# Patient Record
Sex: FEMALE | Race: BLACK OR AFRICAN AMERICAN | NOT HISPANIC OR LATINO | URBAN - METROPOLITAN AREA
[De-identification: names, ages, dates, MRNs, and addresses within clinical notes are randomized per-mention and may not be internally consistent; named-entity substitution may affect disease eponyms.]

---

## 2019-11-12 ENCOUNTER — HOSPITAL ENCOUNTER (EMERGENCY)
Facility: HOSPITAL | Age: 39
Discharge: HOME | End: 2019-11-12
Attending: EMERGENCY MEDICINE

## 2019-11-12 VITALS
BODY MASS INDEX: 35.82 KG/M2 | TEMPERATURE: 99 F | HEIGHT: 65 IN | OXYGEN SATURATION: 96 % | WEIGHT: 215 LBS | HEART RATE: 92 BPM | SYSTOLIC BLOOD PRESSURE: 124 MMHG | RESPIRATION RATE: 20 BRPM | DIASTOLIC BLOOD PRESSURE: 70 MMHG

## 2019-11-12 DIAGNOSIS — J45.901 EXACERBATION OF ASTHMA, UNSPECIFIED ASTHMA SEVERITY, UNSPECIFIED WHETHER PERSISTENT: Primary | ICD-10-CM

## 2019-11-12 DIAGNOSIS — R06.02 SHORTNESS OF BREATH: ICD-10-CM

## 2019-11-12 PROCEDURE — 94761 N-INVAS EAR/PLS OXIMETRY MLT: CPT

## 2019-11-12 PROCEDURE — 93010 EKG 12-LEAD: ICD-10-PCS | Mod: ,,, | Performed by: INTERNAL MEDICINE

## 2019-11-12 PROCEDURE — 94640 AIRWAY INHALATION TREATMENT: CPT

## 2019-11-12 PROCEDURE — 63600175 PHARM REV CODE 636 W HCPCS: Performed by: PHYSICIAN ASSISTANT

## 2019-11-12 PROCEDURE — 93010 ELECTROCARDIOGRAM REPORT: CPT | Mod: ,,, | Performed by: INTERNAL MEDICINE

## 2019-11-12 PROCEDURE — 93005 ELECTROCARDIOGRAM TRACING: CPT

## 2019-11-12 PROCEDURE — 99291 CRITICAL CARE FIRST HOUR: CPT | Mod: 25

## 2019-11-12 PROCEDURE — 96372 THER/PROPH/DIAG INJ SC/IM: CPT

## 2019-11-12 PROCEDURE — 25000242 PHARM REV CODE 250 ALT 637 W/ HCPCS: Performed by: PHYSICIAN ASSISTANT

## 2019-11-12 RX ORDER — IPRATROPIUM BROMIDE AND ALBUTEROL SULFATE 2.5; .5 MG/3ML; MG/3ML
3 SOLUTION RESPIRATORY (INHALATION) EVERY 6 HOURS PRN
COMMUNITY

## 2019-11-12 RX ORDER — ALBUTEROL SULFATE 2.5 MG/.5ML
2.5 SOLUTION RESPIRATORY (INHALATION)
Status: COMPLETED | OUTPATIENT
Start: 2019-11-12 | End: 2019-11-12

## 2019-11-12 RX ORDER — PREDNISONE 50 MG/1
50 TABLET ORAL DAILY
Qty: 5 TABLET | Refills: 0 | Status: SHIPPED | OUTPATIENT
Start: 2019-11-12 | End: 2019-11-17

## 2019-11-12 RX ORDER — ALBUTEROL SULFATE 90 UG/1
2 AEROSOL, METERED RESPIRATORY (INHALATION)
COMMUNITY
Start: 2019-01-10

## 2019-11-12 RX ORDER — FLUTICASONE FUROATE AND VILANTEROL 200; 25 UG/1; UG/1
1 POWDER RESPIRATORY (INHALATION)
COMMUNITY
Start: 2019-01-10

## 2019-11-12 RX ORDER — METHYLPREDNISOLONE SOD SUCC 125 MG
125 VIAL (EA) INJECTION
Status: COMPLETED | OUTPATIENT
Start: 2019-11-12 | End: 2019-11-12

## 2019-11-12 RX ORDER — IPRATROPIUM BROMIDE 0.5 MG/2.5ML
0.5 SOLUTION RESPIRATORY (INHALATION)
Status: COMPLETED | OUTPATIENT
Start: 2019-11-12 | End: 2019-11-12

## 2019-11-12 RX ORDER — PREDNISONE 20 MG/1
60 TABLET ORAL
Status: COMPLETED | OUTPATIENT
Start: 2019-11-12 | End: 2019-11-12

## 2019-11-12 RX ORDER — CETIRIZINE HYDROCHLORIDE 10 MG/1
TABLET ORAL
COMMUNITY
Start: 2017-09-22

## 2019-11-12 RX ADMIN — ALBUTEROL SULFATE 2.5 MG: 2.5 SOLUTION RESPIRATORY (INHALATION) at 07:11

## 2019-11-12 RX ADMIN — METHYLPREDNISOLONE SODIUM SUCCINATE 125 MG: 125 INJECTION, POWDER, FOR SOLUTION INTRAMUSCULAR; INTRAVENOUS at 07:11

## 2019-11-12 RX ADMIN — PREDNISONE 60 MG: 20 TABLET ORAL at 09:11

## 2019-11-12 RX ADMIN — ALBUTEROL SULFATE 2.5 MG: 2.5 SOLUTION RESPIRATORY (INHALATION) at 06:11

## 2019-11-12 RX ADMIN — IPRATROPIUM BROMIDE 0.5 MG: 0.5 SOLUTION RESPIRATORY (INHALATION) at 07:11

## 2019-11-13 NOTE — ED PROVIDER NOTES
Encounter Date: 11/12/2019       History     Chief Complaint   Patient presents with    Shortness of Breath     Pt with history of asthma, co sob since Saturday     This is a 38-year-old female with past medical history of asthma presents to the emergency department complaining of shortness of breath and wheezing that started on Friday.  She reports mild improvement with using her breathing machine and inhaler.  Her shortness of breath worsens with lying and improves with sitting up.  She reports an occasional cough with mucoid sputum.  She denies fever, nasal congestion, headache.  She reports a previous exacerbation that is similar to this severity when she was diagnosed with pneumonia in the past.        Review of patient's allergies indicates:  No Known Allergies  Past Medical History:   Diagnosis Date    Asthma      History reviewed. No pertinent surgical history.  History reviewed. No pertinent family history.  Social History     Tobacco Use    Smoking status: Never Smoker    Smokeless tobacco: Never Used   Substance Use Topics    Alcohol use: Yes     Comment: socially    Drug use: Never     Review of Systems   Constitutional: Negative for chills, fatigue and fever.   HENT: Negative for ear pain, sinus pain and sore throat.    Eyes: Negative for pain.   Respiratory: Positive for chest tightness, shortness of breath and wheezing.    Cardiovascular: Negative for chest pain.   Gastrointestinal: Negative for abdominal pain.   Musculoskeletal: Negative for arthralgias and back pain.   Skin: Negative for pallor and rash.   Neurological: Negative for dizziness.   All other systems reviewed and are negative.      Physical Exam     Initial Vitals [11/12/19 1746]   BP Pulse Resp Temp SpO2   119/79 94 20 98.5 °F (36.9 °C) 100 %      MAP       --         Physical Exam    Nursing note and vitals reviewed.  Constitutional: She appears well-developed and well-nourished. She is not diaphoretic. No distress.   HENT:    Head: Normocephalic and atraumatic.   Eyes: EOM are normal. Pupils are equal, round, and reactive to light.   Neck: Normal range of motion. Neck supple.   Cardiovascular: Normal rate and regular rhythm.   Pulmonary/Chest: No respiratory distress. She exhibits no tenderness.   Abdominal: Soft. Bowel sounds are normal. She exhibits no distension. There is no tenderness. There is no rebound and no guarding.   There is mild wheezes heard on bilateral ausculation.  There is poor air movement noted.   Musculoskeletal: Normal range of motion. She exhibits no edema.   Neurological: She is alert and oriented to person, place, and time.   Skin: Skin is warm. Capillary refill takes less than 2 seconds. No erythema.         ED Course   Critical Care  Date/Time: 11/12/2019 7:00 PM  Performed by: Faith Moctezuma PA-C  Authorized by: Aleena Day MD   Direct patient critical care time: 20 minutes  Additional history critical care time: 20 minutes  Ordering / reviewing critical care time: 7 minutes  Documentation critical care time: 14 minutes  Total critical care time (exclusive of procedural time) : 61 minutes  Critical care was necessary to treat or prevent imminent or life-threatening deterioration of the following conditions: respiratory failure.  Critical care was time spent personally by me on the following activities: development of treatment plan with patient or surrogate, evaluation of patient's response to treatment, examination of patient, obtaining history from patient or surrogate, ordering and performing treatments and interventions, ordering and review of radiographic studies, pulse oximetry, re-evaluation of patient's condition and review of old charts.  Subsequent provider of critical care: I assumed direction of critical care for this patient from another provider of my specialty.        Labs Reviewed - No data to display  EKG Readings: (Independently Interpreted)   Initial Reading: No STEMI. Rhythm:  Sinus Tachycardia. Heart Rate: 108. Ectopy: No Ectopy. Conduction: Normal.       Imaging Results          X-Ray Chest PA And Lateral (Final result)  Result time 11/12/19 20:38:52    Final result by Cheng Sethi MD (11/12/19 20:38:52)                 Impression:      Normal.      Electronically signed by: Cheng Sethi  Date:    11/12/2019  Time:    20:38             Narrative:    EXAMINATION:  XR CHEST PA AND LATERAL    CLINICAL HISTORY:  Asthma;    TECHNIQUE:  PA and lateral views of the chest were performed.    COMPARISON:  None    FINDINGS:  Frontal and lateral views presented.  Heart and hilar shadows mediastinal contours trachea and lungs and pleura appear normal.  No fracture seen.  Visualized spine appears intact.  No abdominal free air.                              X-Rays:   Independently Interpreted Readings:   Other Readings:  Chest x-ray is without any acute cardiopulmonary processes.          APC / Resident Notes:   This is an urgent evaluation of a 38-year-old female who presents to the emergency department complaining of an asthma exacerbation.    Previous medical records were obtained and reviewed.  This patient has a history of asthma.    The patient is currently afebrile and nontoxic in appearance.  Her vital signs were stable. Her pulse ox on room air was noted to be 100%.  On physical exam, there is mild wheezes noted on auscultation bilaterally.  However, the patient is not moving air well.  It is noted that she does become short of breath when she lays down.  The remaining physical exam is unremarkable.  There was no evidence of pneumonia, strep pharyngitis, meningitis.    A chest x-ray was performed.  This revealed no acute cardiopulmonary processes.  She was given 1 breathing treatment.  On re-evaluation, air movement was improved yet still noted to be poor.  Mild wheezes could be heard.  Another breathing treatment was given.  Again, the patient's vital signs remained stable and the  patient reported some relief in symptoms.  However, the patient continued to complain of shortness of breath when lying down.  An hour long continuous duo nebulizer was administered at this time.  On re-evaluation, the patient was moving air well, talking in long sentences, and reported feeling better.  I believe this patient is having an acute asthma exacerbation.  While in the emergency department and during her breathing treatments, IM steroids were administered.  Prior to discharge she was given oral steroids to continue at home.  I will encourage her to continue taking nebulizer treatments and follow up with her doctor.  The patient reports she is noncompliant with her singular that is prescribed by her PCP.  I spent extended period of time talking to her about the importance of medication compliance to control her asthma.  She must follow up with her PCP in the next 48 hr.    Prior to discharge, the patient reports that she still feels as though she is not completely better.  Again, is explained to her the treatment plan.  The patient does not want to be admitted and there are no indications or need for admission at this time.  Signs and symptoms of worsening were thoroughly reviewed with the patient she verbalized understanding and agreement.  She was safe and stable for discharge.                              Clinical Impression:       ICD-10-CM ICD-9-CM   1. Exacerbation of asthma, unspecified asthma severity, unspecified whether persistent J45.901 493.92   2. Shortness of breath R06.02 786.05         Disposition:   Disposition: Discharged  Condition: Stable                     Faith Moctezuma PA-C  11/13/19 1703

## 2019-11-13 NOTE — DISCHARGE INSTRUCTIONS
Continue taking the duonebs every 4-6 hours.  You must follow up with your PCP within the next 24 hours.  Take the steroids as prescribed until complete.  You should start taking the Singular your PCP prescribed previously.

## 2019-11-13 NOTE — ED TRIAGE NOTES
The pt states on Friday she has sinus issues and runny nose. Reports the cough stared on Saturday, so she begin taking her breathing treatments. Says she has a history of asthma. Is out of steroids.

## 2020-04-21 DIAGNOSIS — Z01.84 ANTIBODY RESPONSE EXAMINATION: ICD-10-CM

## 2020-05-21 DIAGNOSIS — Z01.84 ANTIBODY RESPONSE EXAMINATION: ICD-10-CM

## 2020-06-20 DIAGNOSIS — Z01.84 ANTIBODY RESPONSE EXAMINATION: ICD-10-CM

## 2020-07-20 DIAGNOSIS — Z01.84 ANTIBODY RESPONSE EXAMINATION: ICD-10-CM

## 2020-08-19 DIAGNOSIS — Z01.84 ANTIBODY RESPONSE EXAMINATION: ICD-10-CM

## 2020-09-18 DIAGNOSIS — Z01.84 ANTIBODY RESPONSE EXAMINATION: ICD-10-CM

## 2020-10-18 DIAGNOSIS — Z01.84 ANTIBODY RESPONSE EXAMINATION: ICD-10-CM

## 2020-11-17 DIAGNOSIS — Z01.84 ANTIBODY RESPONSE EXAMINATION: ICD-10-CM

## 2023-02-21 ENCOUNTER — HOSPITAL ENCOUNTER (EMERGENCY)
Facility: HOSPITAL | Age: 43
Discharge: HOME | End: 2023-02-21
Attending: EMERGENCY MEDICINE
Payer: MEDICAID

## 2023-02-21 VITALS
WEIGHT: 215 LBS | HEART RATE: 79 BPM | HEIGHT: 65 IN | BODY MASS INDEX: 35.82 KG/M2 | TEMPERATURE: 98 F | RESPIRATION RATE: 18 BRPM | DIASTOLIC BLOOD PRESSURE: 80 MMHG | OXYGEN SATURATION: 100 % | SYSTOLIC BLOOD PRESSURE: 127 MMHG

## 2023-02-21 DIAGNOSIS — H66.90 ACUTE OTITIS MEDIA, UNSPECIFIED OTITIS MEDIA TYPE: Primary | ICD-10-CM

## 2023-02-21 DIAGNOSIS — J06.9 VIRAL URI WITH COUGH: ICD-10-CM

## 2023-02-21 LAB
B-HCG UR QL: NEGATIVE
CTP QC/QA: YES
MOLECULAR STREP A: NEGATIVE
POC MOLECULAR INFLUENZA A AGN: NEGATIVE
POC MOLECULAR INFLUENZA B AGN: NEGATIVE
SARS-COV-2 RDRP RESP QL NAA+PROBE: NEGATIVE

## 2023-02-21 PROCEDURE — 87502 INFLUENZA DNA AMP PROBE: CPT

## 2023-02-21 PROCEDURE — 87651 STREP A DNA AMP PROBE: CPT

## 2023-02-21 PROCEDURE — 99284 EMERGENCY DEPT VISIT MOD MDM: CPT

## 2023-02-21 PROCEDURE — 81025 URINE PREGNANCY TEST: CPT

## 2023-02-21 RX ORDER — BENZONATATE 100 MG/1
100 CAPSULE ORAL 3 TIMES DAILY PRN
Qty: 30 CAPSULE | Refills: 0 | Status: SHIPPED | OUTPATIENT
Start: 2023-02-21

## 2023-02-21 RX ORDER — PROMETHAZINE HYDROCHLORIDE AND DEXTROMETHORPHAN HYDROBROMIDE 6.25; 15 MG/5ML; MG/5ML
5 SYRUP ORAL EVERY 4 HOURS PRN
Qty: 118 ML | Refills: 0 | Status: SHIPPED | OUTPATIENT
Start: 2023-02-21

## 2023-02-21 RX ORDER — FLUTICASONE PROPIONATE 50 MCG
1 SPRAY, SUSPENSION (ML) NASAL 2 TIMES DAILY PRN
Qty: 15 G | Refills: 0 | Status: SHIPPED | OUTPATIENT
Start: 2023-02-21

## 2023-02-21 RX ORDER — ACETAMINOPHEN 500 MG
500 TABLET ORAL EVERY 6 HOURS PRN
Qty: 20 TABLET | Refills: 0 | Status: SHIPPED | OUTPATIENT
Start: 2023-02-21

## 2023-02-21 RX ORDER — IBUPROFEN 600 MG/1
600 TABLET ORAL EVERY 6 HOURS PRN
Qty: 20 TABLET | Refills: 0 | Status: SHIPPED | OUTPATIENT
Start: 2023-02-21

## 2023-02-21 RX ORDER — AMOXICILLIN AND CLAVULANATE POTASSIUM 875; 125 MG/1; MG/1
1 TABLET, FILM COATED ORAL 2 TIMES DAILY
Qty: 14 TABLET | Refills: 0 | Status: SHIPPED | OUTPATIENT
Start: 2023-02-21 | End: 2023-02-28

## 2023-02-21 NOTE — FIRST PROVIDER EVALUATION
Emergency Department TeleTriage Encounter Note      CHIEF COMPLAINT    Chief Complaint   Patient presents with    Sore Throat     Pt reporting body aches, sore throat, cough x 1 week.       VITAL SIGNS   Initial Vitals [02/21/23 1459]   BP Pulse Resp Temp SpO2   122/85 98 18 98.8 °F (37.1 °C) 98 %      MAP       --            ALLERGIES    Review of patient's allergies indicates:  No Known Allergies    PROVIDER TRIAGE NOTE  This is a teletriage evaluation of a 42 y.o. female presenting to the ED complaining of sore throat. Patient reports body ache, sore throat, cough, and low grade fever. Son with similar symptoms. Symptom onset 7 days ago.    Patient is alert and oriented. She speaks in complete sentences. She is sitting upright in the chair in no distress.     Initial orders will be placed and care will be transferred to an alternate provider when patient is roomed for a full evaluation. Any additional orders and the final disposition will be determined by that provider.         ORDERS  Labs Reviewed   SARS-COV-2 RDRP GENE   POCT INFLUENZA A/B MOLECULAR   POCT URINE PREGNANCY   POCT STREP A MOLECULAR       ED Orders (720h ago, onward)      Start Ordered     Status Ordering Provider    02/21/23 1504 02/21/23 1503  POCT urine pregnancy  Once         Ordered HOLDSWORTH, ALAYNA    02/21/23 1504 02/21/23 1503  POCT Strep A, Molecular  Once         Ordered HOLDSWORTH, ALAYNA    02/21/23 1502 02/21/23 1501  POCT COVID-19 Rapid Screening  Once         Ordered KULWANT WATSON    02/21/23 1502 02/21/23 1501  POCT Influenza A/B Molecular  Once         Ordered KULWANT WATSON              Virtual Visit Note: The provider triage portion of this emergency department evaluation and documentation was performed via [employer], a HIPAA-compliant telemedicine application, in concert with a tele-presenter in the room. A face to face patient evaluation with one of my colleagues will occur once the patient is placed in an  emergency department room.      DISCLAIMER: This note was prepared with [employer] voice recognition transcription software. Garbled syntax, mangled pronouns, and other bizarre constructions may be attributed to that software system.

## 2023-02-21 NOTE — ED PROVIDER NOTES
"Encounter Date: 2/21/2023    SCRIBE #1 NOTE: IMichelle, am scribing for, and in the presence of,  Alayna Holdsworth, PA-C. I have scribed the following portions of the note - Other sections scribed: NEFTALI TODD   SCRIBE #2 NOTE: IZULEYKA, am scribing for, and in the presence of,  Alayna Holdsworth, PA-C. I have scribed the following portions of the note - Other sections scribed: CAROLYN TODD.   History     Chief Complaint   Patient presents with    Sore Throat     Pt reporting body aches, sore throat, cough x 1 week.     42-year-old female, with a PMHx of asthma, presents to the ED with complaints of generalized myalgias and congestion onset one week ago. Patient reports symptoms of generalized myalgias, congestion, "stuffy" ears, rhinorrhea, sore throat, wet cough, and intermittent headaches. Endorses taking Zyrtec, Tylenol, Vitamin-C, and Emergen-C with minimal relief of symptoms. Endorses recent sick contact with her son. No other exacerbating or alleviating factors. Denies any fever, sweats, chills, SOB, CP, abdominal pain, nausea, emesis, diarrhea, constipation, dysuria, urinary frequency, or other associated symptoms.     The history is provided by the patient. No  was used.   Review of patient's allergies indicates:  No Known Allergies  Past Medical History:   Diagnosis Date    Asthma      No past surgical history on file.  No family history on file.  Social History     Tobacco Use    Smoking status: Never    Smokeless tobacco: Never   Substance Use Topics    Alcohol use: Yes     Comment: socially    Drug use: Never     Review of Systems   Constitutional:  Negative for chills, diaphoresis, fatigue and fever.   HENT:  Positive for congestion, ear pain (bilateral), rhinorrhea, sinus pressure and sore throat. Negative for ear discharge, postnasal drip, sneezing and voice change.         (+) Stuffy ears.   Respiratory:  Positive for cough (wet). Negative for shortness of breath.  "   Cardiovascular:  Negative for chest pain.   Gastrointestinal:  Negative for abdominal pain, constipation, diarrhea, nausea and vomiting.   Genitourinary:  Negative for decreased urine volume, difficulty urinating, dysuria, frequency and urgency.   Musculoskeletal:  Positive for myalgias (body aches).   Skin:  Negative for rash and wound.   Neurological:  Positive for headaches. Negative for dizziness, weakness, light-headedness and numbness.     Physical Exam     Initial Vitals [02/21/23 1459]   BP Pulse Resp Temp SpO2   122/85 98 18 98.8 °F (37.1 °C) 98 %      MAP       --         Physical Exam    Nursing note and vitals reviewed.  Constitutional: Vital signs are normal. She appears well-developed and well-nourished. She is not diaphoretic. She is active. She does not appear ill. No distress.   HENT:   Head: Normocephalic and atraumatic.   Right Ear: External ear normal. There is tenderness. Tympanic membrane is erythematous.   Left Ear: Tympanic membrane, external ear and ear canal normal.   Nose: Nose normal.   Mouth/Throat: Uvula is midline, oropharynx is clear and moist and mucous membranes are normal.   Eyes: Conjunctivae, EOM and lids are normal. Pupils are equal, round, and reactive to light. Right eye exhibits no discharge. Left eye exhibits no discharge.   Neck: Phonation normal. Neck supple.   Normal range of motion.   Full passive range of motion without pain.     Cardiovascular:  Normal rate and regular rhythm.           Pulmonary/Chest: Effort normal and breath sounds normal. No respiratory distress.   Abdominal: She exhibits no distension.   Musculoskeletal:         General: Normal range of motion.      Cervical back: Full passive range of motion without pain, normal range of motion and neck supple.     Neurological: She is alert and oriented to person, place, and time. GCS eye subscore is 4. GCS verbal subscore is 5. GCS motor subscore is 6.   Skin: Skin is dry. Capillary refill takes less than 2  seconds.       ED Course   Procedures  Labs Reviewed   SARS-COV-2 RDRP GENE   POCT INFLUENZA A/B MOLECULAR   POCT URINE PREGNANCY   POCT STREP A MOLECULAR          Imaging Results    None          Medications - No data to display  Medical Decision Making:   History:   Old Medical Records: I decided to obtain old medical records.  Initial Assessment:   42-year-old female, with a PMHx of asthma, presents to the ED with complaints of generalized myalgias and congestion.  Patient's chart and medical history reviewed.  Differential Diagnosis:   COVID  Influenza  Strep pharyngitis  Viral pharyngitis  Pankaj's angina  Retropharyngeal abscess  Tonsillar abscess  AOM  Otitis externa  Clinical Tests:   Lab Tests: Ordered and Reviewed  ED Management:  Patient's vitals reviewed.  She is afebrile, no respiratory distress, nontoxic-appearing in the ED. Patient had a right erythematous TM with tenderness.  Patient denied pain medication.  UPT is negative.  Patient's COVID, flu, and strep negative.  Patient will be sent home with Augmentin for her acute otitis media.  Instructed patient to rest and stay well hydrated, she verbalized understanding.  Patient will also be sent home on Motrin, Tylenol, breath seen day cough syrup, Tessalon Perles, and Flonase for symptomatic control. Patient agrees with this plan. Discussed with her strict return precautions, she verbalized understanding. Patient is stable for discharge.         Scribe Attestation:   Scribe #1: I performed the above scribed service and the documentation accurately describes the services I performed. I attest to the accuracy of the note.  Scribe #2: I performed the above scribed service and the documentation accurately describes the services I performed. I attest to the accuracy of the note.                 Scribe attestation: I, Alayna Holdsworth,PA-C, personally performed the services described in this documentation. All medical record entries made by the scribe were  at my direction and in my presence.  I have reviewed the chart and agree that the record reflects my personal performance and is accurate and complete.   Clinical Impression:   Final diagnoses:  [J06.9] Viral URI with cough  [H66.90] Acute otitis media, unspecified otitis media type (Primary)        ED Disposition Condition    Discharge Stable          ED Prescriptions       Medication Sig Dispense Start Date End Date Auth. Provider    amoxicillin-clavulanate 875-125mg (AUGMENTIN) 875-125 mg per tablet Take 1 tablet by mouth 2 (two) times daily. for 7 days 14 tablet 2/21/2023 2/28/2023 Alayna Holdsworth, PA-C    ibuprofen (ADVIL,MOTRIN) 600 MG tablet Take 1 tablet (600 mg total) by mouth every 6 (six) hours as needed for Pain. 20 tablet 2/21/2023 -- Alayna Holdsworth, PA-C    acetaminophen (TYLENOL) 500 MG tablet Take 1 tablet (500 mg total) by mouth every 6 (six) hours as needed for Temperature greater than or Pain. 20 tablet 2/21/2023 -- Alayna Holdsworth, PA-C    promethazine-dextromethorphan (PROMETHAZINE-DM) 6.25-15 mg/5 mL Syrp Take 5 mLs by mouth every 4 (four) hours as needed (cough/congestion). 118 mL 2/21/2023 -- Alayna Holdsworth, PA-C    benzonatate (TESSALON) 100 MG capsule Take 1 capsule (100 mg total) by mouth 3 (three) times daily as needed for Cough. 30 capsule 2/21/2023 -- Alayna Holdsworth, PA-C    fluticasone propionate (FLONASE) 50 mcg/actuation nasal spray 1 spray (50 mcg total) by Each Nostril route 2 (two) times daily as needed for Rhinitis or Allergies. 15 g 2/21/2023 -- Alayna Holdsworth, PA-C          Follow-up Information       Follow up With Specialties Details Why Contact [facility] - Emergency Dept Emergency Medicine  If symptoms worsen [id number] [place] [address] [phone]             Alayna Holdsworth, PA-C  02/21/23 1926

## 2023-02-21 NOTE — DISCHARGE INSTRUCTIONS
Thank you for coming to our Emergency Department today. It is important to remember that some problems are difficult to diagnose and may not be found during your first visit. Be sure to follow up with your primary care doctor and review any labs/imaging that was performed with them. If you do not have a primary care doctor, you may contact the one listed on your discharge paperwork or you may also call the [facility] Appointment Desk at [phone] to schedule an appointment with one.     All medications may potentially have side effects and it is impossible to predict which medications may give you side effects. If you feel that you are having a negative effect of any medication you should immediately stop taking them and seek medical attention.    Return to the ER with any questions/concerns, new/concerning symptoms, worsening or failure to improve. Do not drive or make any important decisions for 24 hours if you have received any pain medications, sedatives or mood altering drugs during your ER visit.

## 2023-11-14 ENCOUNTER — HOSPITAL ENCOUNTER (EMERGENCY)
Facility: HOSPITAL | Age: 43
Discharge: HOME | End: 2023-11-14
Attending: EMERGENCY MEDICINE
Payer: COMMERCIAL

## 2023-11-14 VITALS
BODY MASS INDEX: 34.99 KG/M2 | OXYGEN SATURATION: 97 % | RESPIRATION RATE: 20 BRPM | WEIGHT: 210 LBS | DIASTOLIC BLOOD PRESSURE: 79 MMHG | HEART RATE: 108 BPM | HEIGHT: 65 IN | SYSTOLIC BLOOD PRESSURE: 109 MMHG | TEMPERATURE: 101 F

## 2023-11-14 DIAGNOSIS — U07.1 COVID-19: Primary | ICD-10-CM

## 2023-11-14 DIAGNOSIS — R50.9 FEVER: ICD-10-CM

## 2023-11-14 DIAGNOSIS — R05.9 COUGH: ICD-10-CM

## 2023-11-14 LAB
B-HCG UR QL: NEGATIVE
CTP QC/QA: YES
MOLECULAR STREP A: NEGATIVE
POC MOLECULAR INFLUENZA A AGN: NEGATIVE
POC MOLECULAR INFLUENZA B AGN: NEGATIVE
SARS-COV-2 RDRP RESP QL NAA+PROBE: POSITIVE

## 2023-11-14 PROCEDURE — 81025 URINE PREGNANCY TEST: CPT

## 2023-11-14 PROCEDURE — 87502 INFLUENZA DNA AMP PROBE: CPT

## 2023-11-14 PROCEDURE — 87651 STREP A DNA AMP PROBE: CPT

## 2023-11-14 PROCEDURE — 25000003 PHARM REV CODE 250

## 2023-11-14 PROCEDURE — 25000003 PHARM REV CODE 250: Performed by: NURSE PRACTITIONER

## 2023-11-14 PROCEDURE — 96372 THER/PROPH/DIAG INJ SC/IM: CPT | Performed by: NURSE PRACTITIONER

## 2023-11-14 PROCEDURE — 87635 SARS-COV-2 COVID-19 AMP PRB: CPT

## 2023-11-14 PROCEDURE — 99284 EMERGENCY DEPT VISIT MOD MDM: CPT | Mod: 25

## 2023-11-14 PROCEDURE — 63600175 PHARM REV CODE 636 W HCPCS: Performed by: NURSE PRACTITIONER

## 2023-11-14 RX ORDER — IBUPROFEN 600 MG/1
600 TABLET ORAL EVERY 6 HOURS PRN
Qty: 20 TABLET | Refills: 0 | Status: SHIPPED | OUTPATIENT
Start: 2023-11-14

## 2023-11-14 RX ORDER — ONDANSETRON 4 MG/1
4 TABLET, ORALLY DISINTEGRATING ORAL
Status: COMPLETED | OUTPATIENT
Start: 2023-11-14 | End: 2023-11-14

## 2023-11-14 RX ORDER — ONDANSETRON 4 MG/1
4 TABLET, ORALLY DISINTEGRATING ORAL EVERY 6 HOURS PRN
Qty: 20 TABLET | Refills: 0 | Status: SHIPPED | OUTPATIENT
Start: 2023-11-14

## 2023-11-14 RX ORDER — KETOROLAC TROMETHAMINE 30 MG/ML
30 INJECTION, SOLUTION INTRAMUSCULAR; INTRAVENOUS
Status: COMPLETED | OUTPATIENT
Start: 2023-11-14 | End: 2023-11-14

## 2023-11-14 RX ORDER — GUAIFENESIN/DEXTROMETHORPHAN 100-10MG/5
5 SYRUP ORAL 4 TIMES DAILY PRN
Qty: 120 ML | Refills: 0 | Status: SHIPPED | OUTPATIENT
Start: 2023-11-14 | End: 2023-11-24

## 2023-11-14 RX ORDER — ACETAMINOPHEN 500 MG
1000 TABLET ORAL
Status: COMPLETED | OUTPATIENT
Start: 2023-11-14 | End: 2023-11-14

## 2023-11-14 RX ADMIN — KETOROLAC TROMETHAMINE 30 MG: 30 INJECTION, SOLUTION INTRAMUSCULAR; INTRAVENOUS at 04:11

## 2023-11-14 RX ADMIN — ACETAMINOPHEN 1000 MG: 500 TABLET ORAL at 02:11

## 2023-11-14 RX ADMIN — ONDANSETRON 4 MG: 4 TABLET, ORALLY DISINTEGRATING ORAL at 03:11

## 2023-11-14 NOTE — ED PROVIDER NOTES
Encounter Date: 11/14/2023       History     Chief Complaint   Patient presents with    Generalized Body Aches     PT presents to ED with c/o HA, bodyaches, sore throat, V/D x 5 days. Denies abdominal pain and fever.      CC: URI    HPI: Kianna Beckett is a 42 y.o. female who presents to the ED for evaluation of generalized body aches onset 5 days ago. Pt complains of headache, nausea, vomiting, diarrhea, cough, rhinorrhea, decreased appetite, sore throat, congestion, and fatigue. Pt denies any aggravating/alleviating factors. Pt denies taking any medications for their symptoms. Pt denies fever, chills, abdominal pain, or any other associated symptoms. Pt denies any known allergies.     The history is provided by the patient. No  was used.     Review of patient's allergies indicates:  No Known Allergies  Past Medical History:   Diagnosis Date    Asthma      History reviewed. No pertinent surgical history.  History reviewed. No pertinent family history.  Social History     Tobacco Use    Smoking status: Never    Smokeless tobacco: Never   Substance Use Topics    Alcohol use: Yes     Comment: socially    Drug use: Never     Review of Systems   Constitutional:  Positive for appetite change. Negative for chills and fever.   HENT:  Positive for congestion, rhinorrhea and sore throat. Negative for ear pain and trouble swallowing.    Eyes:  Negative for pain, discharge and redness.   Respiratory:  Negative for cough and shortness of breath.    Cardiovascular:  Negative for chest pain.   Gastrointestinal:  Positive for diarrhea, nausea and vomiting. Negative for abdominal pain.   Genitourinary:  Negative for decreased urine volume and dysuria.   Musculoskeletal:  Positive for myalgias. Negative for back pain, neck pain and neck stiffness.   Skin:  Negative for rash.   Neurological:  Positive for headaches. Negative for dizziness, weakness, light-headedness and numbness.   Psychiatric/Behavioral:   Negative for confusion.        Physical Exam     Initial Vitals [11/14/23 1422]   BP Pulse Resp Temp SpO2   109/79 108 20 (!) 101.1 °F (38.4 °C) 97 %      MAP       --         Physical Exam    Nursing note and vitals reviewed.  Constitutional: She appears well-developed and well-nourished. She is not diaphoretic. She is active and cooperative.  Non-toxic appearance. She does not have a sickly appearance. She appears ill. No distress.   HENT:   Head: Normocephalic and atraumatic.   Right Ear: External ear normal.   Left Ear: External ear normal.   Nose: Nose normal.   Eyes: Conjunctivae and EOM are normal. Right eye exhibits no discharge. Left eye exhibits no discharge.   Neck: Neck supple. No tracheal deviation present.   Normal range of motion.  Cardiovascular:  Normal rate.           Pulmonary/Chest: No stridor. No respiratory distress.   Abdominal: Abdomen is soft. She exhibits no distension. There is no abdominal tenderness.   Musculoskeletal:         General: No tenderness. Normal range of motion.      Cervical back: Normal range of motion and neck supple.     Neurological: She is alert and oriented to person, place, and time. She has normal strength. No cranial nerve deficit or sensory deficit.   Skin: Skin is warm and dry.   Psychiatric: She has a normal mood and affect. Her behavior is normal. Judgment and thought content normal.         ED Course   Procedures  Labs Reviewed   SARS-COV-2 RDRP GENE - Abnormal; Notable for the following components:       Result Value    POC Rapid COVID Positive (*)     All other components within normal limits   POCT STREP A MOLECULAR   POCT INFLUENZA A/B MOLECULAR   POCT URINE PREGNANCY          Imaging Results              X-Ray Chest PA And Lateral (Final result)  Result time 11/14/23 16:20:34      Final result by Efe Elena MD (11/14/23 16:20:34)                   Impression:      No acute radiographic abnormality.      Electronically signed by: Efe  Ronleanne marieraul  Date:    11/14/2023  Time:    16:20               Narrative:    EXAMINATION:  XR CHEST PA AND LATERAL    CLINICAL HISTORY:  Cough, unspecified    TECHNIQUE:  PA and lateral views of the chest were performed.    COMPARISON:  11/12/2019    FINDINGS:  The lungs are clear, with normal appearance of pulmonary vasculature and no pleural effusion or pneumothorax.    The cardiac silhouette is normal in size. The hilar and mediastinal contours are unremarkable.    Bones are intact.                                       Medications   acetaminophen tablet 1,000 mg (1,000 mg Oral Given 11/14/23 1436)   ondansetron disintegrating tablet 4 mg (4 mg Oral Given 11/14/23 1509)   ketorolac injection 30 mg (30 mg Intramuscular Given 11/14/23 1604)     Medical Decision Making  DDx:  Influenza, viral syndrome, COVID-19, strep pharyngitis, viral pharyngitis, otitis media, sinusitis, pneumonia, bronchitis, meningitis, sepsis, others    HPI and physical exam as above.      The patient appears to have a viral infection.  Positive for COVID in the ER.  Based upon the history and physical exam the patient does not appear to have a serious bacterial infection such as sepsis, otitis media, bacterial sinusitis, strep pharyngitis, parapharyngeal or peritonsillar abscess, meningitis. Chest x-ray shows nothing acute.  Respiratory effort is normal.  Mucous membranes are moist and the patient is tolerating P.O. without difficulty.  Patient is febrile but treated with antipyretics.  Patient is nontoxic, alert, active, and appears very well at this time just prior to discharge.  Room air oxygen saturation 97%.  I have given specific return precautions to the patient regarding dyspnea.  I will prescribe medications to treat the patient's symptoms.     The results and physical exam findings were reviewed with the patient.  I advised the patient to remain home and self-isolate from others. The patient should wear a surgical mask at all times  when near others.  Strict ED return precautions given especially concerning worsening shortness of breath/dyspnea. All questions regarding diagnosis and plan were answered to the patient's fullest possible satisfaction. Patient expressed understanding of diagnosis, discharge instructions, and return precautions.      Amount and/or Complexity of Data Reviewed  External Data Reviewed: notes.  Labs: ordered. Decision-making details documented in ED Course.  Radiology: ordered. Decision-making details documented in ED Course.    Risk  OTC drugs.  Prescription drug management.            Scribe Attestation:   Scribe #1: I performed the above scribed service and the documentation accurately describes the services I performed. I attest to the accuracy of the note.                      Scribe attestation: I, Juventino Smith NP, personally performed the services described in this documentation.  All medical record entries made by the scribe were at my direction and in my presence.  I have reviewed the chart and agree that the record reflects my personal performance and is accurate and complete.    Clinical Impression:   Final diagnoses:  [U07.1] COVID-19 (Primary)  [R05.9] Cough  [R50.9] Fever        ED Disposition Condition    Discharge Stable          ED Prescriptions       Medication Sig Dispense Start Date End Date Auth. Provider    nirmatrelvir-ritonavir 300 mg (150 mg x 2)-100 mg copackaged tablets (EUA) Take 3 tablets by mouth 2 (two) times daily for 5 days. Each dose contains 2 nirmatrelvir (pink tablets) and 1 ritonavir (white tablet). Take all 3 tablets together 30 tablet 11/14/2023 11/19/2023 Juventino Smith, AUGUSTIN    ibuprofen (ADVIL,MOTRIN) 600 MG tablet Take 1 tablet (600 mg total) by mouth every 6 (six) hours as needed for Pain. 20 tablet 11/14/2023 -- Juventino Smith, NP    ondansetron (ZOFRAN-ODT) 4 MG TbDL Take 1 tablet (4 mg total) by mouth every 6 (six) hours as needed (Nausea). 20 tablet 11/14/2023 --  Juventino Smith, NP    dextromethorphan-guaiFENesin  mg/5 ml (ROBITUSSIN-DM)  mg/5 mL liquid Take 5 mLs by mouth 4 (four) times daily as needed (cough). 120 mL 11/14/2023 11/24/2023 Juventino Smith, AUGUSTIN          Follow-up Information       Follow up With Specialties Details Why Contact Info    [facility] Emergency Medicine Go to  If symptoms worsen, As needed [address] [facility] [place] [id number] [phone]             Juventino Smith, AUGUSTIN  11/16/23 0630

## 2023-11-14 NOTE — Clinical Note
"Kianna "Jimena Beckett was seen and treated in our emergency department on 11/14/2023.     COVID-19 is present in our communities across the state. There is limited testing for COVID at this time, so not all patients can be tested. In this situation, your employee meets the following criteria:    Kianna Beckett has met the criteria for COVID-19 testing and has a POSITIVE result. She can return to work once they are asymptomatic for 24 hours without the use of fever reducing medications AND at least five days from the first positive result. A mask is recommended for 5 days post quarantine.     If you have any questions or concerns, or if I can be of further assistance, please do not hesitate to contact me.    Sincerely,             Juventino Smith, NP"

## 2023-11-14 NOTE — DISCHARGE INSTRUCTIONS

## 2024-05-03 ENCOUNTER — HOSPITAL ENCOUNTER (EMERGENCY)
Facility: HOSPITAL | Age: 44
Discharge: HOME | End: 2024-05-03
Attending: EMERGENCY MEDICINE
Payer: COMMERCIAL

## 2024-05-03 VITALS
DIASTOLIC BLOOD PRESSURE: 72 MMHG | SYSTOLIC BLOOD PRESSURE: 122 MMHG | HEART RATE: 74 BPM | OXYGEN SATURATION: 100 % | RESPIRATION RATE: 18 BRPM | TEMPERATURE: 99 F | BODY MASS INDEX: 34.95 KG/M2 | WEIGHT: 210 LBS

## 2024-05-03 DIAGNOSIS — S09.93XA FACIAL INJURY, INITIAL ENCOUNTER: Primary | ICD-10-CM

## 2024-05-03 LAB
B-HCG UR QL: NEGATIVE
CTP QC/QA: YES

## 2024-05-03 PROCEDURE — 99285 EMERGENCY DEPT VISIT HI MDM: CPT | Mod: 25

## 2024-05-03 PROCEDURE — 81025 URINE PREGNANCY TEST: CPT | Performed by: STUDENT IN AN ORGANIZED HEALTH CARE EDUCATION/TRAINING PROGRAM

## 2024-05-03 RX ORDER — NAPROXEN 500 MG/1
500 TABLET ORAL 2 TIMES DAILY
Qty: 10 TABLET | Refills: 0 | Status: SHIPPED | OUTPATIENT
Start: 2024-05-03 | End: 2024-05-08

## 2024-05-03 NOTE — DISCHARGE INSTRUCTIONS
You were seen in the emergency department today for facial pain. It is important to remember that some problems are difficult to diagnose and may not be found during your Emergency Department visit. Be sure to follow up with your primary care doctor and review all labs/imaging/tests that were performed during this visit with them. Some labs/tests may be outside of the normal range and require non-emergent follow-up and further investigation to help diagnose/exclude/prevent complications or other medical conditions. Return to the emergency department for any new or worsening symptoms. Thank you for allowing me to care for you today, it was my pleasure. I hope you get to feeling better soon!

## 2024-05-03 NOTE — Clinical Note
"Kianna Raymundo" Sophy was seen and treated in our emergency department on 5/3/2024.  She may return to work on 05/06/2024.       If you have any questions or concerns, please don't hesitate to call.      Maged Webber PA-C"
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
## 2025-01-20 ENCOUNTER — HOSPITAL ENCOUNTER (EMERGENCY)
Facility: HOSPITAL | Age: 45
Discharge: HOME | End: 2025-01-20
Attending: EMERGENCY MEDICINE
Payer: COMMERCIAL

## 2025-01-20 VITALS
WEIGHT: 215 LBS | SYSTOLIC BLOOD PRESSURE: 136 MMHG | RESPIRATION RATE: 20 BRPM | TEMPERATURE: 98 F | OXYGEN SATURATION: 99 % | DIASTOLIC BLOOD PRESSURE: 84 MMHG | HEIGHT: 65 IN | HEART RATE: 80 BPM | BODY MASS INDEX: 35.82 KG/M2

## 2025-01-20 DIAGNOSIS — M25.561 ACUTE PAIN OF RIGHT KNEE: ICD-10-CM
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
## 2025-01-20 DIAGNOSIS — M79.671 RIGHT FOOT PAIN: ICD-10-CM

## 2025-01-20 DIAGNOSIS — M25.512 ACUTE PAIN OF LEFT SHOULDER: ICD-10-CM

## 2025-01-20 LAB
B-HCG UR QL: NEGATIVE
CTP QC/QA: YES

## 2025-01-20 PROCEDURE — 99285 EMERGENCY DEPT VISIT HI MDM: CPT | Mod: 25,ER

## 2025-01-20 PROCEDURE — 96372 THER/PROPH/DIAG INJ SC/IM: CPT

## 2025-01-20 PROCEDURE — 81025 URINE PREGNANCY TEST: CPT | Mod: ER | Performed by: EMERGENCY MEDICINE

## 2025-01-20 PROCEDURE — 81025 URINE PREGNANCY TEST: CPT | Mod: ER

## 2025-01-20 PROCEDURE — 63600175 PHARM REV CODE 636 W HCPCS: Mod: JZ,TB,ER

## 2025-01-20 RX ORDER — ACETAMINOPHEN 500 MG
500 TABLET ORAL EVERY 6 HOURS PRN
Qty: 28 TABLET | Refills: 0 | Status: SHIPPED | OUTPATIENT
Start: 2025-01-20 | End: 2025-01-27

## 2025-01-20 RX ORDER — METHOCARBAMOL 500 MG/1
1000 TABLET, FILM COATED ORAL 3 TIMES DAILY
Qty: 30 TABLET | Refills: 0 | Status: SHIPPED | OUTPATIENT
Start: 2025-01-20 | End: 2025-01-25

## 2025-01-20 RX ORDER — KETOROLAC TROMETHAMINE 30 MG/ML
30 INJECTION, SOLUTION INTRAMUSCULAR; INTRAVENOUS
Status: COMPLETED | OUTPATIENT
Start: 2025-01-20 | End: 2025-01-20

## 2025-01-20 RX ORDER — NAPROXEN 500 MG/1
500 TABLET ORAL 2 TIMES DAILY WITH MEALS
Qty: 10 TABLET | Refills: 0 | Status: SHIPPED | OUTPATIENT
Start: 2025-01-20 | End: 2025-01-25

## 2025-01-20 RX ADMIN — KETOROLAC TROMETHAMINE 30 MG: 30 INJECTION, SOLUTION INTRAMUSCULAR; INTRAVENOUS at 03:01
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
## 2025-01-20 NOTE — Clinical Note
"Kianna Raymundo" Sophy was seen and treated in our emergency department on 1/20/2025.  She may return to work on 01/21/2025.       If you have any questions or concerns, please don't hesitate to call.      Lucas Mercer PA-C"

## 2025-01-20 NOTE — DISCHARGE INSTRUCTIONS

## 2025-01-20 NOTE — Clinical Note
"Kianna Raymundo" Sophy was seen and treated in our emergency department on 1/20/2025.  She may return to work on 01/23/2025.       If you have any questions or concerns, please don't hesitate to call.      Lucas Mercer PA-C"

## 2025-05-17 ENCOUNTER — HOSPITAL ENCOUNTER (INPATIENT)
Facility: HOSPITAL | Age: 45
LOS: 2 days | Discharge: HOME | DRG: 812 | End: 2025-05-22
Attending: EMERGENCY MEDICINE | Admitting: INTERNAL MEDICINE
Payer: COMMERCIAL

## 2025-05-17 DIAGNOSIS — R07.9 CHEST PAIN: ICD-10-CM

## 2025-05-17 DIAGNOSIS — R06.02 SOB (SHORTNESS OF BREATH): ICD-10-CM

## 2025-05-17 DIAGNOSIS — R52 PAIN: ICD-10-CM

## 2025-05-17 DIAGNOSIS — R06.09 DYSPNEA ON EXERTION: Primary | ICD-10-CM

## 2025-05-17 DIAGNOSIS — Z13.6 SCREENING FOR CARDIOVASCULAR CONDITION: ICD-10-CM

## 2025-05-17 LAB
ABSOLUTE EOSINOPHIL (OHS): 0.1 K/UL
ABSOLUTE MONOCYTE (OHS): 0.64 K/UL (ref 0.3–1)
ABSOLUTE NEUTROPHIL COUNT (OHS): 6.95 K/UL (ref 1.8–7.7)
ALBUMIN SERPL BCP-MCNC: 2.9 G/DL (ref 3.5–5.2)
ALP SERPL-CCNC: 70 UNIT/L (ref 40–150)
ALT SERPL W/O P-5'-P-CCNC: 77 UNIT/L (ref 10–44)
ANION GAP (OHS): 10 MMOL/L (ref 8–16)
AST SERPL-CCNC: 63 UNIT/L (ref 11–45)
B-HCG UR QL: NEGATIVE
BACTERIA #/AREA URNS AUTO: ABNORMAL /HPF
BASOPHILS # BLD AUTO: 0.03 K/UL
BASOPHILS NFR BLD AUTO: 0.3 %
BILIRUB SERPL-MCNC: 0.9 MG/DL (ref 0.1–1)
BILIRUB UR QL STRIP.AUTO: NEGATIVE
BIPAP: 0
BNP SERPL-MCNC: <10 PG/ML (ref 0–99)
BUN SERPL-MCNC: 14 MG/DL (ref 6–20)
CALCIUM SERPL-MCNC: 9.1 MG/DL (ref 8.7–10.5)
CHLORIDE SERPL-SCNC: 102 MMOL/L (ref 95–110)
CLARITY UR: ABNORMAL
CO2 SERPL-SCNC: 24 MMOL/L (ref 23–29)
COLOR UR AUTO: YELLOW
CORRECTED TEMPERATURE (PCO2): 29.7 MMHG
CORRECTED TEMPERATURE (PH): 7.55
CORRECTED TEMPERATURE (PO2): 37.2 MMHG
CREAT SERPL-MCNC: 0.7 MG/DL (ref 0.5–1.4)
CTP QC/QA: YES
ERYTHROCYTE [DISTWIDTH] IN BLOOD BY AUTOMATED COUNT: 13.2 % (ref 11.5–14.5)
FIO2: 21 %
GFR SERPLBLD CREATININE-BSD FMLA CKD-EPI: >60 ML/MIN/1.73/M2
GLUCOSE SERPL-MCNC: 96 MG/DL (ref 70–110)
GLUCOSE UR QL STRIP: NEGATIVE
HCT VFR BLD AUTO: 31 % (ref 37–48.5)
HCT VFR BLD CALC: 32.7 %
HCV AB SERPL QL IA: NORMAL
HGB BLD-MCNC: 10 GM/DL (ref 12–16)
HGB UR QL STRIP: ABNORMAL
HIV 1+2 AB+HIV1 P24 AG SERPL QL IA: NORMAL
HOLD SPECIMEN: NORMAL
HOLD SPECIMEN: NORMAL
HYALINE CASTS UR QL AUTO: 7 /LPF (ref 0–1)
IMM GRANULOCYTES # BLD AUTO: 0.05 K/UL (ref 0–0.04)
IMM GRANULOCYTES NFR BLD AUTO: 0.5 % (ref 0–0.5)
KETONES UR QL STRIP: ABNORMAL
LDH SERPL L TO P-CCNC: 1 MMOL/L (ref 0.5–2.2)
LEUKOCYTE ESTERASE UR QL STRIP: ABNORMAL
LYMPHOCYTES # BLD AUTO: 3.06 K/UL (ref 1–4.8)
MCH RBC QN AUTO: 29.1 PG (ref 27–31)
MCHC RBC AUTO-ENTMCNC: 32.3 G/DL (ref 32–36)
MCV RBC AUTO: 90 FL (ref 82–98)
MICROSCOPIC COMMENT: ABNORMAL
NITRITE UR QL STRIP: NEGATIVE
NUCLEATED RBC (/100WBC) (OHS): 0 /100 WBC
PCO2 BLDA: 29.7 MMHG
PH SMN: 7.55 [PH]
PH UR STRIP: 6 [PH]
PLATELET # BLD AUTO: 252 K/UL (ref 150–450)
PMV BLD AUTO: 9.5 FL (ref 9.2–12.9)
PO2 BLDA: 37.2 MMHG
POC BASE DEFICIT: 3.7 MMOL/L
POC HCO3: 27.4 MMOL/L
POC PERFORMED BY: NORMAL
POC TEMPERATURE: 37 C
POTASSIUM SERPL-SCNC: 3.5 MMOL/L (ref 3.5–5.1)
PROCALCITONIN SERPL-MCNC: 0.08 NG/ML
PROT SERPL-MCNC: 7.1 GM/DL (ref 6–8.4)
PROT UR QL STRIP: ABNORMAL
RBC # BLD AUTO: 3.44 M/UL (ref 4–5.4)
RBC #/AREA URNS AUTO: 5 /HPF (ref 0–4)
RELATIVE EOSINOPHIL (OHS): 0.9 %
RELATIVE LYMPHOCYTE (OHS): 28.3 % (ref 18–48)
RELATIVE MONOCYTE (OHS): 5.9 % (ref 4–15)
RELATIVE NEUTROPHIL (OHS): 64.1 % (ref 38–73)
SODIUM SERPL-SCNC: 136 MMOL/L (ref 136–145)
SP GR UR STRIP: >=1.03
SPECIMEN SOURCE: NORMAL
SQUAMOUS #/AREA URNS AUTO: 26 /HPF
TROPONIN I SERPL HS-MCNC: <3 NG/L
UROBILINOGEN UR STRIP-ACNC: ABNORMAL EU/DL
WBC # BLD AUTO: 10.83 K/UL (ref 3.9–12.7)
WBC #/AREA URNS AUTO: 9 /HPF (ref 0–5)

## 2025-05-17 PROCEDURE — 99900035 HC TECH TIME PER 15 MIN (STAT)

## 2025-05-17 PROCEDURE — 63600175 PHARM REV CODE 636 W HCPCS: Performed by: EMERGENCY MEDICINE

## 2025-05-17 PROCEDURE — 25000003 PHARM REV CODE 250: Performed by: EMERGENCY MEDICINE

## 2025-05-17 PROCEDURE — 96365 THER/PROPH/DIAG IV INF INIT: CPT | Mod: 59

## 2025-05-17 PROCEDURE — 84484 ASSAY OF TROPONIN QUANT: CPT | Performed by: EMERGENCY MEDICINE

## 2025-05-17 PROCEDURE — 87040 BLOOD CULTURE FOR BACTERIA: CPT | Performed by: EMERGENCY MEDICINE

## 2025-05-17 PROCEDURE — 93005 ELECTROCARDIOGRAM TRACING: CPT

## 2025-05-17 PROCEDURE — 82803 BLOOD GASES ANY COMBINATION: CPT

## 2025-05-17 PROCEDURE — 96375 TX/PRO/DX INJ NEW DRUG ADDON: CPT

## 2025-05-17 PROCEDURE — 25500020 PHARM REV CODE 255: Performed by: EMERGENCY MEDICINE

## 2025-05-17 PROCEDURE — 84145 PROCALCITONIN (PCT): CPT | Performed by: EMERGENCY MEDICINE

## 2025-05-17 PROCEDURE — 85025 COMPLETE CBC W/AUTO DIFF WBC: CPT | Performed by: EMERGENCY MEDICINE

## 2025-05-17 PROCEDURE — 80053 COMPREHEN METABOLIC PANEL: CPT | Performed by: EMERGENCY MEDICINE

## 2025-05-17 PROCEDURE — 96361 HYDRATE IV INFUSION ADD-ON: CPT

## 2025-05-17 PROCEDURE — 81001 URINALYSIS AUTO W/SCOPE: CPT | Mod: XB | Performed by: EMERGENCY MEDICINE

## 2025-05-17 PROCEDURE — 82746 ASSAY OF FOLIC ACID SERUM: CPT | Performed by: HOSPITALIST

## 2025-05-17 PROCEDURE — 87389 HIV-1 AG W/HIV-1&-2 AB AG IA: CPT | Performed by: EMERGENCY MEDICINE

## 2025-05-17 PROCEDURE — 93010 ELECTROCARDIOGRAM REPORT: CPT | Mod: ,,, | Performed by: INTERNAL MEDICINE

## 2025-05-17 PROCEDURE — 83605 ASSAY OF LACTIC ACID: CPT

## 2025-05-17 PROCEDURE — 96366 THER/PROPH/DIAG IV INF ADDON: CPT

## 2025-05-17 PROCEDURE — 99291 CRITICAL CARE FIRST HOUR: CPT

## 2025-05-17 PROCEDURE — 80307 DRUG TEST PRSMV CHEM ANLYZR: CPT | Performed by: HOSPITALIST

## 2025-05-17 PROCEDURE — 82607 VITAMIN B-12: CPT | Performed by: HOSPITALIST

## 2025-05-17 PROCEDURE — 81025 URINE PREGNANCY TEST: CPT | Performed by: EMERGENCY MEDICINE

## 2025-05-17 PROCEDURE — 83880 ASSAY OF NATRIURETIC PEPTIDE: CPT | Performed by: EMERGENCY MEDICINE

## 2025-05-17 PROCEDURE — 86803 HEPATITIS C AB TEST: CPT | Performed by: EMERGENCY MEDICINE

## 2025-05-17 RX ORDER — VANCOMYCIN 2 GRAM/500 ML IN 0.9 % SODIUM CHLORIDE INTRAVENOUS
20
Status: COMPLETED | OUTPATIENT
Start: 2025-05-17 | End: 2025-05-18

## 2025-05-17 RX ORDER — HYDROMORPHONE HYDROCHLORIDE 1 MG/ML
1 INJECTION, SOLUTION INTRAMUSCULAR; INTRAVENOUS; SUBCUTANEOUS
Refills: 0 | Status: COMPLETED | OUTPATIENT
Start: 2025-05-17 | End: 2025-05-17

## 2025-05-17 RX ADMIN — VANCOMYCIN HYDROCHLORIDE 2000 MG: 10 INJECTION, POWDER, LYOPHILIZED, FOR SOLUTION INTRAVENOUS at 10:05

## 2025-05-17 RX ADMIN — PIPERACILLIN SODIUM AND TAZOBACTAM SODIUM 4.5 G: 4; .5 INJECTION, POWDER, FOR SOLUTION INTRAVENOUS at 10:05

## 2025-05-17 RX ADMIN — HYDROMORPHONE HYDROCHLORIDE 1 MG: 1 INJECTION, SOLUTION INTRAMUSCULAR; INTRAVENOUS; SUBCUTANEOUS at 07:05

## 2025-05-17 RX ADMIN — IOHEXOL 100 ML: 350 INJECTION, SOLUTION INTRAVENOUS at 10:05

## 2025-05-17 RX ADMIN — SODIUM CHLORIDE 1000 ML: 0.9 INJECTION, SOLUTION INTRAVENOUS at 10:05

## 2025-05-17 NOTE — ED TRIAGE NOTES
Kianna Beckett, a 44 y.o. female presents to the ED w/ complaint of post-op problem. Pt arrives to ED via personal vehicle from home with multiple complaints. Pt is 3 days post op from breast reduction/lift, liposuction and tummy tuck. Pt reporting shortness of breath, that worsens with activity. Pt states that she left her lovenox on the plane and has been unable to have a bowel movement in over 5days.     Triage note:  Chief Complaint   Patient presents with    Post-op Problem     Breast reduction and lift, lipo to sides , tummy tuck in [place], feeling sob, lovenox was left on plane, no bm in 5d     Review of patient's allergies indicates:  No Known Allergies  Past Medical History:   Diagnosis Date    Asthma

## 2025-05-18 PROBLEM — D62 ACUTE BLOOD LOSS ANEMIA: Status: ACTIVE | Noted: 2025-05-18

## 2025-05-18 PROBLEM — D24.1 FIBROADENOMA OF BREAST, RIGHT: Status: RESOLVED | Noted: 2019-02-22 | Resolved: 2025-05-18

## 2025-05-18 PROBLEM — N39.46 URGE AND STRESS INCONTINENCE: Chronic | Status: RESOLVED | Noted: 2020-11-02 | Resolved: 2025-05-18

## 2025-05-18 PROBLEM — E66.9 OBESITY (BMI 35.0-39.9 WITHOUT COMORBIDITY): Status: ACTIVE | Noted: 2024-04-10

## 2025-05-18 PROBLEM — R06.00 DYSPNEA: Status: ACTIVE | Noted: 2025-05-18

## 2025-05-18 PROBLEM — M79.89 SUBCUTANEOUS FAT NECROSIS: Status: RESOLVED | Noted: 2024-04-10 | Resolved: 2025-05-18

## 2025-05-18 PROBLEM — R00.0 TACHYCARDIA: Status: ACTIVE | Noted: 2025-05-18

## 2025-05-18 PROBLEM — R31.21 ASYMPTOMATIC MICROSCOPIC HEMATURIA: Chronic | Status: RESOLVED | Noted: 2020-11-02 | Resolved: 2025-05-18

## 2025-05-18 PROBLEM — G43.909 MIGRAINE WITHOUT STATUS MIGRAINOSUS, NOT INTRACTABLE: Status: ACTIVE | Noted: 2024-04-10

## 2025-05-18 PROBLEM — Z98.890 S/P COSMETIC PLASTIC SURGERY: Status: ACTIVE | Noted: 2025-05-18

## 2025-05-18 PROBLEM — J30.9 ALLERGIC RHINITIS: Status: ACTIVE | Noted: 2024-04-10

## 2025-05-18 PROBLEM — R06.09 DYSPNEA ON EXERTION: Status: ACTIVE | Noted: 2025-05-18

## 2025-05-18 PROBLEM — R73.03 PREDIABETES: Status: ACTIVE | Noted: 2025-04-16

## 2025-05-18 PROBLEM — J45.40 MODERATE PERSISTENT ASTHMA WITHOUT COMPLICATION: Status: ACTIVE | Noted: 2024-04-10

## 2025-05-18 LAB
ABSOLUTE EOSINOPHIL (OHS): 0.11 K/UL
ABSOLUTE EOSINOPHIL (OHS): 0.18 K/UL
ABSOLUTE MONOCYTE (OHS): 0.63 K/UL (ref 0.3–1)
ABSOLUTE MONOCYTE (OHS): 0.67 K/UL (ref 0.3–1)
ABSOLUTE NEUTROPHIL COUNT (OHS): 5.16 K/UL (ref 1.8–7.7)
ABSOLUTE NEUTROPHIL COUNT (OHS): 5.92 K/UL (ref 1.8–7.7)
ALBUMIN SERPL BCP-MCNC: 2.5 G/DL (ref 3.5–5.2)
ALP SERPL-CCNC: 59 UNIT/L (ref 40–150)
ALT SERPL W/O P-5'-P-CCNC: 73 UNIT/L (ref 10–44)
AMPHET UR QL SCN: NEGATIVE
ANION GAP (OHS): 10 MMOL/L (ref 8–16)
APTT PPP: NORMAL S
AST SERPL-CCNC: 54 UNIT/L (ref 11–45)
BARBITURATE SCN PRESENT UR: NEGATIVE
BASOPHILS # BLD AUTO: 0.03 K/UL
BASOPHILS # BLD AUTO: 0.04 K/UL
BASOPHILS NFR BLD AUTO: 0.3 %
BASOPHILS NFR BLD AUTO: 0.5 %
BENZODIAZ UR QL SCN: ABNORMAL
BILIRUB SERPL-MCNC: 0.8 MG/DL (ref 0.1–1)
BUN SERPL-MCNC: 13 MG/DL (ref 6–20)
CALCIUM SERPL-MCNC: 7.9 MG/DL (ref 8.7–10.5)
CANNABINOIDS UR QL SCN: NEGATIVE
CHLORIDE SERPL-SCNC: 107 MMOL/L (ref 95–110)
CO2 SERPL-SCNC: 21 MMOL/L (ref 23–29)
COCAINE UR QL SCN: NEGATIVE
CREAT SERPL-MCNC: 0.7 MG/DL (ref 0.5–1.4)
CREAT UR-MCNC: 328 MG/DL (ref 15–325)
ERYTHROCYTE [DISTWIDTH] IN BLOOD BY AUTOMATED COUNT: 13.2 % (ref 11.5–14.5)
ERYTHROCYTE [DISTWIDTH] IN BLOOD BY AUTOMATED COUNT: 13.2 % (ref 11.5–14.5)
ETHANOL UR-MCNC: <10 MG/DL
FERRITIN SERPL-MCNC: 145 NG/ML (ref 20–300)
FOLATE SERPL-MCNC: 6.4 NG/ML (ref 4–24)
GFR SERPLBLD CREATININE-BSD FMLA CKD-EPI: >60 ML/MIN/1.73/M2
GLUCOSE SERPL-MCNC: 102 MG/DL (ref 70–110)
HCT VFR BLD AUTO: 28.2 % (ref 37–48.5)
HCT VFR BLD AUTO: 28.8 % (ref 37–48.5)
HGB BLD-MCNC: 8.9 GM/DL (ref 12–16)
HGB BLD-MCNC: 8.9 GM/DL (ref 12–16)
IMM GRANULOCYTES # BLD AUTO: 0.08 K/UL (ref 0–0.04)
IMM GRANULOCYTES # BLD AUTO: 0.08 K/UL (ref 0–0.04)
IMM GRANULOCYTES NFR BLD AUTO: 0.9 % (ref 0–0.5)
IMM GRANULOCYTES NFR BLD AUTO: 0.9 % (ref 0–0.5)
INR PPP: 1 (ref 0.8–1.2)
INR PPP: NORMAL
IRON SATN MFR SERPL: 12 % (ref 20–50)
IRON SERPL-MCNC: 28 UG/DL (ref 30–160)
LACTATE SERPL-SCNC: 0.9 MMOL/L (ref 0.5–2.2)
LYMPHOCYTES # BLD AUTO: 2.24 K/UL (ref 1–4.8)
LYMPHOCYTES # BLD AUTO: 2.34 K/UL (ref 1–4.8)
MAGNESIUM SERPL-MCNC: 1.8 MG/DL (ref 1.6–2.6)
MCH RBC QN AUTO: 28.9 PG (ref 27–31)
MCH RBC QN AUTO: 29.2 PG (ref 27–31)
MCHC RBC AUTO-ENTMCNC: 30.9 G/DL (ref 32–36)
MCHC RBC AUTO-ENTMCNC: 31.6 G/DL (ref 32–36)
MCV RBC AUTO: 93 FL (ref 82–98)
MCV RBC AUTO: 94 FL (ref 82–98)
METHADONE UR QL SCN: NEGATIVE
NUCLEATED RBC (/100WBC) (OHS): 0 /100 WBC
NUCLEATED RBC (/100WBC) (OHS): 0 /100 WBC
OHS QRS DURATION: 80 MS
OHS QTC CALCULATION: 421 MS
OPIATES UR QL SCN: ABNORMAL
PCP UR QL: NEGATIVE
PHOSPHATE SERPL-MCNC: 3.2 MG/DL (ref 2.7–4.5)
PLATELET # BLD AUTO: 212 K/UL (ref 150–450)
PLATELET # BLD AUTO: 255 K/UL (ref 150–450)
PMV BLD AUTO: 9 FL (ref 9.2–12.9)
PMV BLD AUTO: 9.3 FL (ref 9.2–12.9)
POTASSIUM SERPL-SCNC: 3.3 MMOL/L (ref 3.5–5.1)
PROCALCITONIN SERPL-MCNC: 0.07 NG/ML
PROT SERPL-MCNC: 6 GM/DL (ref 6–8.4)
PROTHROMBIN TIME: 10.9 SECONDS (ref 9–12.5)
PROTHROMBIN TIME: NORMAL S
RBC # BLD AUTO: 3.05 M/UL (ref 4–5.4)
RBC # BLD AUTO: 3.08 M/UL (ref 4–5.4)
RELATIVE EOSINOPHIL (OHS): 1.2 %
RELATIVE EOSINOPHIL (OHS): 2.1 %
RELATIVE LYMPHOCYTE (OHS): 24.9 % (ref 18–48)
RELATIVE LYMPHOCYTE (OHS): 27.6 % (ref 18–48)
RELATIVE MONOCYTE (OHS): 7 % (ref 4–15)
RELATIVE MONOCYTE (OHS): 7.9 % (ref 4–15)
RELATIVE NEUTROPHIL (OHS): 61 % (ref 38–73)
RELATIVE NEUTROPHIL (OHS): 65.7 % (ref 38–73)
RETICS/RBC NFR AUTO: 2.2 % (ref 0.5–2.5)
SODIUM SERPL-SCNC: 138 MMOL/L (ref 136–145)
TIBC SERPL-MCNC: 243 UG/DL (ref 250–450)
TRANSFERRIN SERPL-MCNC: 164 MG/DL (ref 200–375)
VIT B12 SERPL-MCNC: 468 PG/ML (ref 210–950)
WBC # BLD AUTO: 8.47 K/UL (ref 3.9–12.7)
WBC # BLD AUTO: 9.01 K/UL (ref 3.9–12.7)

## 2025-05-18 PROCEDURE — 94799 UNLISTED PULMONARY SVC/PX: CPT

## 2025-05-18 PROCEDURE — 96367 TX/PROPH/DG ADDL SEQ IV INF: CPT

## 2025-05-18 PROCEDURE — 83735 ASSAY OF MAGNESIUM: CPT | Performed by: HOSPITALIST

## 2025-05-18 PROCEDURE — 63600175 PHARM REV CODE 636 W HCPCS: Performed by: HOSPITALIST

## 2025-05-18 PROCEDURE — 96372 THER/PROPH/DIAG INJ SC/IM: CPT | Performed by: HOSPITALIST

## 2025-05-18 PROCEDURE — 25000003 PHARM REV CODE 250: Performed by: INTERNAL MEDICINE

## 2025-05-18 PROCEDURE — 25000003 PHARM REV CODE 250: Performed by: HOSPITALIST

## 2025-05-18 PROCEDURE — 85730 THROMBOPLASTIN TIME PARTIAL: CPT | Performed by: INTERNAL MEDICINE

## 2025-05-18 PROCEDURE — 84100 ASSAY OF PHOSPHORUS: CPT | Performed by: HOSPITALIST

## 2025-05-18 PROCEDURE — 96366 THER/PROPH/DIAG IV INF ADDON: CPT

## 2025-05-18 PROCEDURE — G0378 HOSPITAL OBSERVATION PER HR: HCPCS

## 2025-05-18 PROCEDURE — 25000242 PHARM REV CODE 250 ALT 637 W/ HCPCS: Performed by: EMERGENCY MEDICINE

## 2025-05-18 PROCEDURE — 94664 DEMO&/EVAL PT USE INHALER: CPT | Mod: XB

## 2025-05-18 PROCEDURE — 96376 TX/PRO/DX INJ SAME DRUG ADON: CPT

## 2025-05-18 PROCEDURE — 85610 PROTHROMBIN TIME: CPT | Mod: 91 | Performed by: INTERNAL MEDICINE

## 2025-05-18 PROCEDURE — 85045 AUTOMATED RETICULOCYTE COUNT: CPT | Performed by: HOSPITALIST

## 2025-05-18 PROCEDURE — 94761 N-INVAS EAR/PLS OXIMETRY MLT: CPT

## 2025-05-18 PROCEDURE — 25000242 PHARM REV CODE 250 ALT 637 W/ HCPCS: Performed by: HOSPITALIST

## 2025-05-18 PROCEDURE — 84466 ASSAY OF TRANSFERRIN: CPT | Performed by: HOSPITALIST

## 2025-05-18 PROCEDURE — 94640 AIRWAY INHALATION TREATMENT: CPT | Mod: XB

## 2025-05-18 PROCEDURE — 36415 COLL VENOUS BLD VENIPUNCTURE: CPT | Performed by: HOSPITALIST

## 2025-05-18 PROCEDURE — 94640 AIRWAY INHALATION TREATMENT: CPT

## 2025-05-18 PROCEDURE — 63600175 PHARM REV CODE 636 W HCPCS: Performed by: EMERGENCY MEDICINE

## 2025-05-18 PROCEDURE — 96375 TX/PRO/DX INJ NEW DRUG ADDON: CPT

## 2025-05-18 PROCEDURE — 85025 COMPLETE CBC W/AUTO DIFF WBC: CPT | Performed by: HOSPITALIST

## 2025-05-18 PROCEDURE — 84145 PROCALCITONIN (PCT): CPT | Performed by: HOSPITALIST

## 2025-05-18 PROCEDURE — 82728 ASSAY OF FERRITIN: CPT | Performed by: HOSPITALIST

## 2025-05-18 PROCEDURE — 63600175 PHARM REV CODE 636 W HCPCS: Performed by: INTERNAL MEDICINE

## 2025-05-18 PROCEDURE — 83605 ASSAY OF LACTIC ACID: CPT | Performed by: HOSPITALIST

## 2025-05-18 PROCEDURE — 84460 ALANINE AMINO (ALT) (SGPT): CPT | Performed by: HOSPITALIST

## 2025-05-18 PROCEDURE — 85610 PROTHROMBIN TIME: CPT | Mod: 59 | Performed by: HOSPITALIST

## 2025-05-18 RX ORDER — MONTELUKAST SODIUM 10 MG/1
10 TABLET ORAL DAILY
Status: DISCONTINUED | OUTPATIENT
Start: 2025-05-18 | End: 2025-05-22 | Stop reason: HOSPADM

## 2025-05-18 RX ORDER — GABAPENTIN 300 MG/1
300 CAPSULE ORAL 3 TIMES DAILY
Status: DISCONTINUED | OUTPATIENT
Start: 2025-05-18 | End: 2025-05-19

## 2025-05-18 RX ORDER — ONDANSETRON HYDROCHLORIDE 2 MG/ML
4 INJECTION, SOLUTION INTRAVENOUS
Status: COMPLETED | OUTPATIENT
Start: 2025-05-18 | End: 2025-05-18

## 2025-05-18 RX ORDER — ENOXAPARIN SODIUM 100 MG/ML
40 INJECTION SUBCUTANEOUS EVERY 12 HOURS
Status: DISCONTINUED | OUTPATIENT
Start: 2025-05-18 | End: 2025-05-20

## 2025-05-18 RX ORDER — ENOXAPARIN SODIUM 100 MG/ML
30 INJECTION SUBCUTANEOUS DAILY
COMMUNITY

## 2025-05-18 RX ORDER — MONTELUKAST SODIUM 10 MG/1
10 TABLET ORAL DAILY
COMMUNITY
Start: 2025-04-16 | End: 2025-05-18

## 2025-05-18 RX ORDER — CETIRIZINE HYDROCHLORIDE 10 MG/1
10 TABLET ORAL DAILY
Status: DISCONTINUED | OUTPATIENT
Start: 2025-05-18 | End: 2025-05-22 | Stop reason: HOSPADM

## 2025-05-18 RX ORDER — OXYCODONE HYDROCHLORIDE 5 MG/1
5 TABLET ORAL EVERY 6 HOURS PRN
Refills: 0 | Status: DISCONTINUED | OUTPATIENT
Start: 2025-05-18 | End: 2025-05-22 | Stop reason: HOSPADM

## 2025-05-18 RX ORDER — IPRATROPIUM BROMIDE AND ALBUTEROL SULFATE 2.5; .5 MG/3ML; MG/3ML
3 SOLUTION RESPIRATORY (INHALATION)
Status: DISPENSED | OUTPATIENT
Start: 2025-05-18 | End: 2025-05-20

## 2025-05-18 RX ORDER — AMOXICILLIN 250 MG
1 CAPSULE ORAL 2 TIMES DAILY PRN
Status: DISCONTINUED | OUTPATIENT
Start: 2025-05-18 | End: 2025-05-22 | Stop reason: HOSPADM

## 2025-05-18 RX ORDER — SODIUM CHLORIDE 9 MG/ML
INJECTION, SOLUTION INTRAVENOUS CONTINUOUS
Status: ACTIVE | OUTPATIENT
Start: 2025-05-18 | End: 2025-05-18

## 2025-05-18 RX ORDER — ALUMINUM HYDROXIDE, MAGNESIUM HYDROXIDE, AND SIMETHICONE 1200; 120; 1200 MG/30ML; MG/30ML; MG/30ML
30 SUSPENSION ORAL 4 TIMES DAILY PRN
Status: DISCONTINUED | OUTPATIENT
Start: 2025-05-18 | End: 2025-05-22 | Stop reason: HOSPADM

## 2025-05-18 RX ORDER — SODIUM CHLORIDE 0.9 % (FLUSH) 0.9 %
10 SYRINGE (ML) INJECTION EVERY 6 HOURS PRN
Status: DISCONTINUED | OUTPATIENT
Start: 2025-05-18 | End: 2025-05-22 | Stop reason: HOSPADM

## 2025-05-18 RX ORDER — SODIUM FERRIC GLUCONATE COMPLEX IN SUCROSE 12.5 MG/ML
125 INJECTION INTRAVENOUS DAILY
Status: DISCONTINUED | OUTPATIENT
Start: 2025-05-18 | End: 2025-05-18

## 2025-05-18 RX ORDER — VANCOMYCIN 1.75 GRAM/500 ML IN 0.9 % SODIUM CHLORIDE INTRAVENOUS
1750
Status: DISCONTINUED | OUTPATIENT
Start: 2025-05-18 | End: 2025-05-19

## 2025-05-18 RX ORDER — AMOXICILLIN AND CLAVULANATE POTASSIUM 875; 125 MG/1; MG/1
1 TABLET, FILM COATED ORAL 2 TIMES DAILY
COMMUNITY
Start: 2025-05-13 | End: 2025-05-26 | Stop reason: SDUPTHER

## 2025-05-18 RX ORDER — ELECTROLYTES/DEXTROSE
600 SOLUTION, ORAL ORAL ONCE
Status: COMPLETED | OUTPATIENT
Start: 2025-05-18 | End: 2025-05-18

## 2025-05-18 RX ORDER — OXYCODONE HYDROCHLORIDE 10 MG/1
10 TABLET ORAL EVERY 6 HOURS PRN
Refills: 0 | Status: DISCONTINUED | OUTPATIENT
Start: 2025-05-18 | End: 2025-05-22 | Stop reason: HOSPADM

## 2025-05-18 RX ORDER — KETOROLAC TROMETHAMINE 30 MG/ML
15 INJECTION, SOLUTION INTRAMUSCULAR; INTRAVENOUS
Status: COMPLETED | OUTPATIENT
Start: 2025-05-18 | End: 2025-05-18

## 2025-05-18 RX ORDER — HYDROMORPHONE HYDROCHLORIDE 1 MG/ML
0.5 INJECTION, SOLUTION INTRAMUSCULAR; INTRAVENOUS; SUBCUTANEOUS
Status: COMPLETED | OUTPATIENT
Start: 2025-05-18 | End: 2025-05-18

## 2025-05-18 RX ORDER — FLUTICASONE FUROATE AND VILANTEROL 200; 25 UG/1; UG/1
1 POWDER RESPIRATORY (INHALATION) DAILY
Status: DISCONTINUED | OUTPATIENT
Start: 2025-05-18 | End: 2025-05-22 | Stop reason: HOSPADM

## 2025-05-18 RX ORDER — DIAZEPAM 5 MG/1
2.5-5 TABLET ORAL EVERY 8 HOURS PRN
COMMUNITY
Start: 2025-05-13

## 2025-05-18 RX ORDER — KETOROLAC TROMETHAMINE 30 MG/ML
15 INJECTION, SOLUTION INTRAMUSCULAR; INTRAVENOUS
Status: DISCONTINUED | OUTPATIENT
Start: 2025-05-18 | End: 2025-05-18

## 2025-05-18 RX ORDER — FLUTICASONE PROPIONATE 50 MCG
1 SPRAY, SUSPENSION (ML) NASAL 2 TIMES DAILY PRN
Status: DISCONTINUED | OUTPATIENT
Start: 2025-05-18 | End: 2025-05-22 | Stop reason: HOSPADM

## 2025-05-18 RX ORDER — POTASSIUM CHLORIDE 20 MEQ/1
40 TABLET, EXTENDED RELEASE ORAL ONCE
Status: COMPLETED | OUTPATIENT
Start: 2025-05-18 | End: 2025-05-18

## 2025-05-18 RX ORDER — ALBUTEROL SULFATE 2.5 MG/.5ML
2.5 SOLUTION RESPIRATORY (INHALATION)
Status: COMPLETED | OUTPATIENT
Start: 2025-05-18 | End: 2025-05-18

## 2025-05-18 RX ORDER — PROCHLORPERAZINE EDISYLATE 5 MG/ML
5 INJECTION INTRAMUSCULAR; INTRAVENOUS EVERY 6 HOURS PRN
Status: DISCONTINUED | OUTPATIENT
Start: 2025-05-18 | End: 2025-05-22 | Stop reason: HOSPADM

## 2025-05-18 RX ORDER — OXYCODONE AND ACETAMINOPHEN 5; 325 MG/1; MG/1
1 TABLET ORAL EVERY 6 HOURS PRN
Status: ON HOLD | COMMUNITY
Start: 2025-05-13 | End: 2025-05-22 | Stop reason: HOSPADM

## 2025-05-18 RX ORDER — BUTALBITAL, ACETAMINOPHEN AND CAFFEINE 50; 325; 40 MG/1; MG/1; MG/1
1 TABLET ORAL EVERY 6 HOURS PRN
Status: DISCONTINUED | OUTPATIENT
Start: 2025-05-18 | End: 2025-05-22 | Stop reason: HOSPADM

## 2025-05-18 RX ORDER — NALOXONE HCL 0.4 MG/ML
0.02 VIAL (ML) INJECTION
Status: DISCONTINUED | OUTPATIENT
Start: 2025-05-18 | End: 2025-05-22 | Stop reason: HOSPADM

## 2025-05-18 RX ORDER — TALC
6 POWDER (GRAM) TOPICAL NIGHTLY PRN
Status: DISCONTINUED | OUTPATIENT
Start: 2025-05-18 | End: 2025-05-22 | Stop reason: HOSPADM

## 2025-05-18 RX ORDER — FERROUS SULFATE 325(65) MG
325 TABLET, DELAYED RELEASE (ENTERIC COATED) ORAL DAILY
COMMUNITY

## 2025-05-18 RX ORDER — HYDROMORPHONE HYDROCHLORIDE 1 MG/ML
0.5 INJECTION, SOLUTION INTRAMUSCULAR; INTRAVENOUS; SUBCUTANEOUS EVERY 6 HOURS PRN
Status: DISCONTINUED | OUTPATIENT
Start: 2025-05-18 | End: 2025-05-22 | Stop reason: HOSPADM

## 2025-05-18 RX ORDER — BUTALBITAL, ACETAMINOPHEN AND CAFFEINE 50; 325; 40 MG/1; MG/1; MG/1
1 TABLET ORAL EVERY 4 HOURS PRN
COMMUNITY
Start: 2025-04-16

## 2025-05-18 RX ORDER — ACETAMINOPHEN 325 MG/1
650 TABLET ORAL EVERY 4 HOURS PRN
Status: DISCONTINUED | OUTPATIENT
Start: 2025-05-18 | End: 2025-05-22 | Stop reason: HOSPADM

## 2025-05-18 RX ORDER — POLYETHYLENE GLYCOL 3350 17 G/17G
17 POWDER, FOR SOLUTION ORAL DAILY PRN
Status: DISCONTINUED | OUTPATIENT
Start: 2025-05-18 | End: 2025-05-22 | Stop reason: HOSPADM

## 2025-05-18 RX ORDER — AMOXICILLIN 250 MG
1 CAPSULE ORAL DAILY PRN
Status: DISCONTINUED | OUTPATIENT
Start: 2025-05-18 | End: 2025-05-22 | Stop reason: HOSPADM

## 2025-05-18 RX ORDER — ONDANSETRON HYDROCHLORIDE 2 MG/ML
4 INJECTION, SOLUTION INTRAVENOUS EVERY 8 HOURS PRN
Status: DISCONTINUED | OUTPATIENT
Start: 2025-05-18 | End: 2025-05-22 | Stop reason: HOSPADM

## 2025-05-18 RX ORDER — LANOLIN ALCOHOL/MO/W.PET/CERES
1 CREAM (GRAM) TOPICAL DAILY
Status: DISCONTINUED | OUTPATIENT
Start: 2025-05-18 | End: 2025-05-22 | Stop reason: HOSPADM

## 2025-05-18 RX ADMIN — GABAPENTIN 300 MG: 300 CAPSULE ORAL at 09:05

## 2025-05-18 RX ADMIN — CETIRIZINE HYDROCHLORIDE 10 MG: 10 TABLET, FILM COATED ORAL at 08:05

## 2025-05-18 RX ADMIN — VANCOMYCIN HYDROCHLORIDE 1750 MG: 10 INJECTION, POWDER, LYOPHILIZED, FOR SOLUTION INTRAVENOUS at 11:05

## 2025-05-18 RX ADMIN — FLUTICASONE FUROATE AND VILANTEROL TRIFENATATE 1 PUFF: 200; 25 POWDER RESPIRATORY (INHALATION) at 11:05

## 2025-05-18 RX ADMIN — ONDANSETRON 4 MG: 2 INJECTION INTRAMUSCULAR; INTRAVENOUS at 06:05

## 2025-05-18 RX ADMIN — HYDROMORPHONE HYDROCHLORIDE 0.5 MG: 0.5 INJECTION, SOLUTION INTRAMUSCULAR; INTRAVENOUS; SUBCUTANEOUS at 01:05

## 2025-05-18 RX ADMIN — ALBUTEROL SULFATE 2.5 MG: 2.5 SOLUTION RESPIRATORY (INHALATION) at 01:05

## 2025-05-18 RX ADMIN — PROCHLORPERAZINE EDISYLATE 5 MG: 5 INJECTION INTRAMUSCULAR; INTRAVENOUS at 10:05

## 2025-05-18 RX ADMIN — GABAPENTIN 300 MG: 300 CAPSULE ORAL at 05:05

## 2025-05-18 RX ADMIN — ENOXAPARIN SODIUM 40 MG: 40 INJECTION SUBCUTANEOUS at 09:05

## 2025-05-18 RX ADMIN — ONDANSETRON 4 MG: 2 INJECTION INTRAMUSCULAR; INTRAVENOUS at 01:05

## 2025-05-18 RX ADMIN — OXYCODONE HYDROCHLORIDE 10 MG: 10 TABLET ORAL at 06:05

## 2025-05-18 RX ADMIN — MONTELUKAST 10 MG: 10 TABLET, FILM COATED ORAL at 08:05

## 2025-05-18 RX ADMIN — LACTULOSE 20 G: 20 SOLUTION ORAL at 09:05

## 2025-05-18 RX ADMIN — OXYCODONE HYDROCHLORIDE 10 MG: 10 TABLET ORAL at 10:05

## 2025-05-18 RX ADMIN — IPRATROPIUM BROMIDE AND ALBUTEROL SULFATE 3 ML: 2.5; .5 SOLUTION RESPIRATORY (INHALATION) at 08:05

## 2025-05-18 RX ADMIN — POTASSIUM CHLORIDE 40 MEQ: 1500 TABLET, EXTENDED RELEASE ORAL at 06:05

## 2025-05-18 RX ADMIN — Medication 600 ML: at 06:05

## 2025-05-18 RX ADMIN — GABAPENTIN 300 MG: 300 CAPSULE ORAL at 11:05

## 2025-05-18 RX ADMIN — VANCOMYCIN HYDROCHLORIDE 1750 MG: 10 INJECTION, POWDER, LYOPHILIZED, FOR SOLUTION INTRAVENOUS at 10:05

## 2025-05-18 RX ADMIN — PIPERACILLIN SODIUM AND TAZOBACTAM SODIUM 4.5 G: 4; .5 INJECTION, POWDER, FOR SOLUTION INTRAVENOUS at 05:05

## 2025-05-18 RX ADMIN — SODIUM CHLORIDE 125 MG: 9 INJECTION, SOLUTION INTRAVENOUS at 09:05

## 2025-05-18 RX ADMIN — SODIUM CHLORIDE: 9 INJECTION, SOLUTION INTRAVENOUS at 06:05

## 2025-05-18 RX ADMIN — IPRATROPIUM BROMIDE AND ALBUTEROL SULFATE 3 ML: 2.5; .5 SOLUTION RESPIRATORY (INHALATION) at 02:05

## 2025-05-18 RX ADMIN — ENOXAPARIN SODIUM 40 MG: 40 INJECTION SUBCUTANEOUS at 08:05

## 2025-05-18 RX ADMIN — KETOROLAC TROMETHAMINE 15 MG: 30 INJECTION, SOLUTION INTRAMUSCULAR; INTRAVENOUS at 02:05

## 2025-05-18 RX ADMIN — PIPERACILLIN SODIUM AND TAZOBACTAM SODIUM 4.5 G: 4; .5 INJECTION, POWDER, FOR SOLUTION INTRAVENOUS at 06:05

## 2025-05-18 RX ADMIN — FERROUS SULFATE TAB EC 325 MG (65 MG FE EQUIVALENT) 1 EACH: 325 (65 FE) TABLET DELAYED RESPONSE at 08:05

## 2025-05-18 NOTE — PHARMACY MED REC
"Admission Medication History     The home medication history was taken by Janet Qureshi.    You may go to "Admission" then "Reconcile Home Medications" tabs to review and/or act upon these items.     The home medication list has been updated by the Pharmacy department.   Please read ALL comments highlighted in yellow.   Please address this information as you see fit.    Feel free to contact us if you have any questions or require assistance.      The medications listed below were removed from the home medication list. Please reorder if appropriate:  Patient reports no longer taking the following medication(s):  MONTELUKAST 10 MG    Medications listed below were obtained from: Patient/family and Analytic software- [employer]  Current Outpatient Medications on File Prior to Encounter   Medication Sig    albuterol-ipratropium (DUO-NEB) 2.5 mg-0.5 mg/3 mL nebulizer solution   Take 3 mLs by nebulization every 6 (six) hours as needed for Wheezing. Rescue    amoxicillin-clavulanate 875-125mg (AUGMENTIN) 875-125 mg per tablet   Take 1 tablet by mouth 2 (two) times daily.    butalbital-acetaminophen-caffeine -40 mg (FIORICET, ESGIC) -40 mg per tablet   Take 1 tablet by mouth every 4 (four) hours as needed for Headaches.    cetirizine (ZYRTEC) 10 MG tablet   Take 10 mg by mouth once daily.    diazePAM (VALIUM) 5 MG tablet   Take 2.5-5 mg by mouth every 8 (eight) hours as needed for Anxiety.    enoxaparin (LOVENOX) 30 mg/0.3 mL Syrg   Inject 30 mg into the skin once daily. (x 7 days)    ferrous sulfate 325 (65 FE) MG EC tablet   Take 325 mg by mouth once daily.    fluticasone furoate-vilanterol (BREO) 200-25 mcg/dose DsDv diskus inhaler   Inhale 1 puff into the lungs once daily.    fluticasone propionate (FLONASE) 50 mcg/actuation nasal spray   1 spray (50 mcg total) by Each Nostril route 2 (two) times daily as needed for Rhinitis or Allergies.    oxyCODONE-acetaminophen (PERCOCET) 5-325 mg per tablet Take 1 tablet by " mouth every 6 (six) hours as needed for Pain.                   Janet Qureshi  EXT [id number]                  .

## 2025-05-18 NOTE — PROGRESS NOTES
Pharmacokinetic Initial Assessment: IV Vancomycin    Assessment/Plan:    Initiate intravenous vancomycin with loading dose of 2000 mg once followed by a maintenance dose of vancomycin 1750 mg IV every 12 hours  Desired empiric serum trough concentration is 10 to 15 mcg/mL  Draw vancomycin trough level 60 min prior to fourth dose on 5/19/25 at approximately 0930 or sooner if renal function changes significantly  Pharmacy will continue to follow and monitor vancomycin.      Please contact pharmacy at extension [phone] with any questions regarding this assessment.     Thank you for the consult,   Azam Whitfield       Patient brief summary:  Kianna Beckett is a 44 y.o. female initiated on antimicrobial therapy with IV Vancomycin for treatment of suspected skin & soft tissue infection    Drug Allergies:   Review of patient's allergies indicates:  No Known Allergies    Actual Body Weight:   103.9 kg    Renal Function:   Estimated Creatinine Clearance: 120.2 mL/min (based on SCr of 0.7 mg/dL).,     Dialysis Method (if applicable):  N/A    CBC (last 72 hours):  Recent Labs   Lab Result Units 05/17/25 1920 05/18/25  0419   WBC K/uL 10.83 9.01   HGB gm/dL 10.0* 8.9*   HCT % 31.0* 28.8*   Platelet Count K/uL 252 212   Lymph % % 28.3 24.9   Mono % % 5.9 7.0   Eos % % 0.9 1.2   Basophil % % 0.3 0.3       Metabolic Panel (last 72 hours):  Recent Labs   Lab Result Units 05/17/25 1920 05/17/25 2036 05/18/25  0419   Sodium mmol/L 136  --  138   Potassium mmol/L 3.5  --  3.3*   Chloride mmol/L 102  --  107   CO2 mmol/L 24  --  21*   Glucose mg/dL 96  --  102   Glucose, UA   --  Negative  --    BUN mg/dL 14  --  13   Creatinine mg/dL 0.7  --  0.7   Urine Creatinine mg/dL  --  328.0*  --    Albumin g/dL 2.9*  --  2.5*   Bilirubin Total mg/dL 0.9  --  0.8   ALP unit/L 70  --  59   AST unit/L 63*  --  54*   ALT unit/L 77*  --  73*   Magnesium  mg/dL  --   --  1.8   Phosphorus Level mg/dL  --   --  3.2       Drug levels (last 3  "results):  No results for input(s): "VANCOMYCINRA", "VANCORANDOM", "VANCOMYCINPE", "VANCOPEAK", "VANCOMYCINTR", "VANCOTROUGH" in the last 72 hours.    Microbiologic Results:  Microbiology Results (last 7 days)       Procedure Component Value Units Date/Time    Blood culture x two cultures. Draw prior to antibiotics. [[id number]]  (Normal) Collected: 05/17/25 1916    Order Status: Completed Specimen: Blood from Peripheral, Antecubital, Left Updated: 05/18/25 0403     Blood Culture No Growth After 6 Hours    Blood culture x two cultures. Draw prior to antibiotics. [[id number]]  (Normal) Collected: 05/17/25 1945    Order Status: Completed Specimen: Blood from Peripheral, Hand, Right Updated: 05/18/25 0403     Blood Culture No Growth After 6 Hours            "

## 2025-05-18 NOTE — H&P
Miguel kemi - Emergency Dept  [facility] Medicine  History & Physical    Patient Name: Kianna Beckett  MRN: [id number]  Patient Class: OP- Observation  Admission Date: 5/17/2025  Attending Physician: Rony Horta MD [facility] MED O  Admitting Physician: Chris Ferguson MD  Primary Care Provider: Peg Primary Doctor    Patient information was obtained from patient, past medical records, and ER records.     Subjective:     Principal Problem:Dyspnea on exertion    Chief Complaint:   Chief Complaint   Patient presents with    Post-op Problem     Breast reduction and lift, lipo to sides , tummy tuck in [place], feeling sob, lovenox was left on plane, no bm in 5d        HPI: 45 y/o AAF w/ Asthma, allergic rhinitis, obesity presents to the ED for complaints of dyspnea on exertion.  She reports undergoing multiple cosmetic surgery procedures on 5/14 this past week @ an ambulatory surgery clinic in [place].  Procedures included, tummy tuck, breast reduction, breast lift, liposuction from buttocks & back.  She recovered after surgery in hotel, had a friend accompany her, she reported feeling unwell after surgery and was concerned about an area of wound dehiscence at site of tummy tuck.  She returned to surgical clinic, initially reports they told her no dehischence but states when wound was inspected, there was opening along incision and additional sutures placed.  She has two bulb drains(SABINA?) from site of tummy tuck.      On flight back reports experiencing dyspnea on exertion that has persistent since arrival back to home on Friday.  She feels dehydrated, notes oliguria.  She is worried about her blood counts, pre-op hgb in April [facility] labs 13.4.  Initial hgb here is 10.0  she states she was in surgery for 6 hours, received general anesthesia.   She was given instructions for wound care, 2 week course of augmentin, PRN oxycodone, ativan prescriptions.  She reports she was going to remove surgical drains when output ceased.   " She has compression garment for breasts & abdominal binder.     Per ED notes she also experienced right calf pain and swelling, In the ED she had w/u for infection with blood cultures, given doses of Vancomycin and Zosyn, received albuterol nebulizer. Dilaudid 0.5mg/1mg iv, Toradol 15mg IV, Zofran 4mg x1, 1Liter normal saline.     Imaging studies of US Bilat LE venous, CTA Chest PE protocol, CT abd pelvis w/ IV contrast.   CT imaging notable for a fluid collection in soft tissue of back, possibly a seroma and a "Soft tissue abnormality within the perineum to the left of the rectum of uncertain etiology"    Procedure performed @ [facility], [place], [place].     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 No pertinent surgical history.    Review of patient's allergies indicates:  No Known Allergies    No current facility-administered medications on file prior to encounter.     Current Outpatient Medications on File Prior to Encounter   Medication Sig    albuterol-ipratropium (DUO-NEB) 2.5 mg-0.5 mg/3 mL nebulizer solution Take 3 mLs by nebulization every 6 (six) hours as needed for Wheezing. Rescue    amoxicillin-clavulanate 875-125mg (AUGMENTIN) 875-125 mg per tablet Take 1 tablet by mouth 2 (two) times daily.    butalbital-acetaminophen-caffeine -40 mg (FIORICET, ESGIC) -40 mg per tablet Take 1 tablet by mouth every 4 (four) hours as needed.    cetirizine (ZYRTEC) 10 MG tablet Take 10 mg by mouth once daily.    diazePAM (VALIUM) 5 MG tablet Take 2.5-5 mg by mouth every 8 (eight) hours as needed for Anxiety.    ferrous sulfate 325 (65 FE) MG EC tablet Take 325 mg by mouth once daily.    fluticasone furoate-vilanterol (BREO) 200-25 mcg/dose DsDv diskus inhaler Inhale 1 puff into the lungs once daily. "    fluticasone propionate (FLONASE) 50 mcg/actuation nasal spray 1 spray (50 mcg total) by Each Nostril route 2 (two) times daily as needed for Rhinitis or Allergies.    oxyCODONE-acetaminophen (PERCOCET) 5-325 mg per tablet Take 1 tablet by mouth every 6 (six) hours as needed.    montelukast (SINGULAIR) 10 mg tablet Take 10 mg by mouth once daily.    [DISCONTINUED] albuterol (PROVENTIL/VENTOLIN HFA) 90 mcg/actuation inhaler Inhale 2 puffs into the lungs.    [DISCONTINUED] benzonatate (TESSALON) 100 MG capsule Take 1 capsule (100 mg total) by mouth 3 (three) times daily as needed for Cough.    [DISCONTINUED] ibuprofen (ADVIL,MOTRIN) 600 MG tablet Take 1 tablet (600 mg total) by mouth every 6 (six) hours as needed for Pain.    [DISCONTINUED] ibuprofen (ADVIL,MOTRIN) 600 MG tablet Take 1 tablet (600 mg total) by mouth every 6 (six) hours as needed for Pain.    [DISCONTINUED] loratadine 10 mg Cap Take by mouth.    [DISCONTINUED] ondansetron (ZOFRAN-ODT) 4 MG TbDL Take 1 tablet (4 mg total) by mouth every 6 (six) hours as needed (Nausea).    [DISCONTINUED] promethazine-dextromethorphan (PROMETHAZINE-DM) 6.25-15 mg/5 mL Syrp Take 5 mLs by mouth every 4 (four) hours as needed (cough/congestion).     Family History    None       Tobacco Use    Smoking status: Never    Smokeless tobacco: Never   Substance and Sexual Activity    Alcohol use: Yes     Comment: socially    Drug use: Never    Sexual activity: Not on file     Review of Systems   Constitutional:  Positive for activity change. Negative for chills and fever.   HENT:  Negative for trouble swallowing.    Cardiovascular:  Negative for palpitations and leg swelling.   Gastrointestinal:  Positive for abdominal pain. Negative for vomiting.   Genitourinary:  Positive for decreased urine volume.   Neurological:  Negative for dizziness and light-headedness.   Psychiatric/Behavioral:  Negative for confusion.      Objective:     Vital Signs (Most Recent):  Temp: 97.6 °F (36.4  °C) (05/18/25 0410)  Pulse: 94 (05/18/25 0410)  Resp: 20 (05/18/25 0648)  BP: 107/77 (05/18/25 0410)  SpO2: 96 % (05/18/25 0410) Vital Signs (24h Range):  Temp:  [97.6 °F (36.4 °C)-98.6 °F (37 °C)] 97.6 °F (36.4 °C)  Pulse:  [] 94  Resp:  [16-20] 20  SpO2:  [96 %-100 %] 96 %  BP: ()/(53-88) 107/77     Weight: 103.9 kg (229 lb)  Body mass index is 38.11 kg/m².     Physical Exam  Vitals and nursing note reviewed.   Constitutional:       Appearance: She is obese.   HENT:      Head: Normocephalic and atraumatic.   Eyes:      General: No scleral icterus.  Cardiovascular:      Rate and Rhythm: Normal rate and regular rhythm.      Heart sounds: No murmur heard.  Pulmonary:      Effort: Pulmonary effort is normal. No respiratory distress.      Breath sounds: No wheezing.      Comments: Decreased breath sounds but pt wearing compression garment  Abdominal:      Comments: Abdominal binder in place, 2 drains in suprapubic region w/ serous drainage   Musculoskeletal:      Right lower leg: No edema.      Left lower leg: No edema.   Neurological:      Mental Status: She is alert and oriented to person, place, and time.                Significant Labs: All pertinent labs within the past 24 hours have been reviewed.  CBC:   Recent Labs   Lab 05/17/25 1920 05/17/25 1926 05/18/25 0419   WBC 10.83  --  9.01   HGB 10.0*  --  8.9*   HCT 31.0* 32.7 28.8*     --  212     CMP:   Recent Labs   Lab 05/17/25 1920 05/18/25 0419    138   K 3.5 3.3*    107   CO2 24 21*   GLU 96 102   BUN 14 13   CREATININE 0.7 0.7   CALCIUM 9.1 7.9*   PROT 7.1 6.0   ALBUMIN 2.9* 2.5*   BILITOT 0.9 0.8   ALKPHOS 70 59   AST 63* 54*   ALT 77* 73*   ANIONGAP 10 10       Significant Imaging: I have reviewed all pertinent imaging results/findings within the past 24 hours.    CTA CHEST NON CORONARY (PE STUDIES); CT ABDOMEN PELVIS WITH IV CONTRAST     CLINICAL HISTORY:  Pulmonary embolism (PE) suspected, high prob;; Sepsis;      TECHNIQUE:  CT examination of the chest abdomen and pelvis was performed following the IV administration of 100 mL of Omnipaque 350.  CT examination of the chest was performed via pulmonary arterial angiographic protocol, axial imaging, sagittal and coronal reconstruction imaging as well as axial MIP reconstruction imaging is submitted, single phase postcontrast CT examination of the abdomen and pelvis is submitted, sagittal and coronal reconstruction imaging is submitted, oral contrast was not utilized.     COMPARISON:  None     FINDINGS:  Note is made that the patient is reportedly recent the status post cosmetic surgical procedures.  The entirety of the breasts, more so on the left, as well as the peripheral aspect of the chest body wall bilaterally is not completely included on the field of view.     There is appearance of air density associated with the subcutaneous fat planes of the right breast that may relate to recent procedure without evidence for well-formed fluid collection, evaluation of the left breast is limited.  There are tubular structures likely relating to drain catheter is entering via the inferior abdominal subcutaneous fat planes, and seen within the subcutaneous fat planes of the lower anterior abdominopelvic wall.  There is haziness within the aforementioned lower anterior abdominopelvic wall that may relate to edema and or inflammation as well as air density likely relating to the recent procedure without evidence for well-formed fluid collection to specifically suggest abscess.  There is appearance of haziness throughout the subcutaneous fat planes of the flank and more prominently the back bilaterally, there are multiple areas of air density seen within the subcutaneous fat planes of the back as well.  There also appears to be a small area of air fluid collection as seen on axial image 59 series 3, measuring 1.4 x 2.7 cm in size, this does not have a thick-walled appearance, may relate  to a seroma however correlation for signs or symptoms of infection is needed.     There is no large central saddle type pulmonary embolus.  The pulmonary arterial vasculature demonstrate heterogeneity, the appearance of which is consistent with artifact however diminishes the sensitivity of the examination as does mild motion artifact, small distal pulmonary emboli cannot be excluded otherwise when accounting for limitations of the exam an abnormal pattern of pulmonary arterial filling defects specific for pulmonary emboli is not seen.     The thoracic aorta demonstrates appropriate opacification.  There is no enlarged mediastinal or hilar adenopathy.  There is no evidence for pericardial effusion.     The lungs demonstrate motion artifact and diminished depth of inspiration, bandlike opacity on the right likely represents artifact.  There is no dense consolidation.  There is no evidence for pleural effusion.  There is no evidence for pneumothorax.     There is a moderate to large hiatal hernia noted.  The stomach otherwise demonstrates nonspecific appearance of fluid and air within the gastric lumen.  The gallbladder surgically absent.  Biliary dilatation may relate to passive biliary dilatation of prior cholecystectomy, is somewhat more prominent than typical, clinical and historical and laboratory correlation is otherwise needed.  There is no additional evidence for acute process of the liver, pancreas, spleen or adrenal glands.     Small renal hypodensities on the right are noted, too small for characterization.  There is no evidence for ureteral calculus or obstructive uropathy or perinephric inflammatory change bilaterally.  The abdominal aorta appears normal in caliber, demonstrates appropriate opacification.  The urinary bladder is incompletely distended.  There is no evidence for acute process of the uterus or adnexa.     There is no evidence for small bowel obstructive process.  The appendix is identified,  it does not appear inflamed.  There is no evidence for inflammatory or obstructive process of the colon.  There is no evidence for free intraperitoneal air or intraperitoneal or retroperitoneal abscess.  Findings along the midline of the lower anterior abdominopelvic wall including fat density and air between the rectus abdominus muscles may relate to postoperative change.     As best seen on series 3 axial image 177 there is an area of soft tissue abnormality within the fat planes of the perineum lateral to the rectum on the left, this measures approximately 3.5 x 4.7 cm in size, is of uncertain etiology, benign and malignant etiologies would be in the differential, clinical and historical correlation is needed.     The visualized osseous structures appear intact.  Chronic changes are noted.     Impression:     There is no large central saddle type pulmonary embolus, sensitivity of the examination is diminished due to artifact, as above and small distal pulmonary emboli cannot be excluded otherwise an abnormal pattern of pulmonary arterial filling defects specific for pulmonary emboli is not seen.     The lungs demonstrate appearance of diminished depth of inspiration and atelectatic change.     Postoperative findings associated with the soft tissues of the right breast, the anterior abdominal wall as well as the subcutaneous soft tissues of the back, as discussed above including areas of haziness and stranding that may relate to edema/inflammation, as well as air density, in addition there is appearance of a small air and fluid collection within the subcutaneous fat planes of the back measuring approximately 1.4 x 2.7 cm in size, this may relate to seroma however correlation for signs or symptoms of infection is otherwise needed, as discussed above.     Soft tissue abnormality within the perineum to the left of the rectum of uncertain etiology, benign and malignant etiologies would be in the differential, clinical  and historical correlation is needed.     Biliary dilatation may relate to passive biliary dilatation of prior cholecystectomy, however somewhat more prominent than typically seen, clinical and historical and laboratory correlation is needed.     Moderate to large hiatal hernia.     Additional findings as above.     This report was flagged in Epic as abnormal.        Electronically signed by: Naseem Knott  Date:                                            05/17/2025  Time:                                           23:12      US LOWER EXTREMITY VEINS BILATERAL     CLINICAL HISTORY:  Pain, unspecified     TECHNIQUE:  Duplex and color flow Doppler and dynamic compression was performed of the bilateral lower extremity veins was performed.     COMPARISON:  None     FINDINGS:  Right thigh veins: The common femoral, femoral, popliteal, upper greater saphenous, and deep femoral veins are patent and free of thrombus. The veins are normally compressible and have normal phasic flow and augmentation response.     Right calf veins: The visualized calf veins are patent.     Left thigh veins: The common femoral, femoral, popliteal, upper greater saphenous, and deep femoral veins are patent and free of thrombus. The veins are normally compressible and have normal phasic flow and augmentation response.     Left calf veins: The visualized calf veins are patent.     Miscellaneous: None     Impression:     No evidence of deep venous thrombosis in either lower extremity.        Electronically signed by: Tc Rivera  Date:                                            05/18/2025  Time:                                           00:31  Assessment/Plan:     Assessment & Plan  Dyspnea on exertion  -s/p CT chest imaging - pt with atelectasis,  her CBC trend overnight shows downtrending hgb new anemia   -no acute wheezing on exam - but will scheduled some nebulizers.   -She is having severe pain with minimal movement in bed and suspect  some hypoventilation leading to atelectatic changes on CT given surgical procedure to the back/abdomen and breast  - start incentive spirometry  -workup of anemia as per Acute blood loss anemia problem.     Acute blood loss anemia  -etiology secondary to recent surgical procedures and operative blood loss  -Add-on iron studies to AM labs - f/u results, patient takes oral iron as outpatient and had normal CBC pre-op.  I suspect she will require IV iron infusions if deficient.  If symptoms persist despite IV iron therapy would discuss with patient PRBC for symptomatic anemia.     Plan  - Monitor serial CBC: Every 12 hours  - Transfuse PRBC if patient becomes hemodynamically unstable, symptomatic or H/H drops below 7/21.  - Patient has not received any PRBC transfusions to date  - Patient's anemia is currently is worsening    Most recent hemoglobin and hematocrit are listed below.  Recent Labs     05/17/25  1920 05/17/25  1926 05/18/25  0419   HGB 10.0*  --  8.9*   HCT 31.0* 32.7 28.8*     Moderate persistent asthma without complication  Continue home BREO     Schedule duonebs q6hrs while awake x 2 days.     Do not suspect an isolated asthma exacerbation will hold any glucocorticoid at this time given empiric antibiotics and recent surgical procedures.     S/P cosmetic plastic surgery  Patient with abnormal findings on CT scan   -Plastic surgery consulted by ED team, pt reports she was seen overnight. Would f/u with Plastic surgery consult team during day time to review patients imaging and discuss if the abnormal soft tissue fluid collection in the back and the perineal soft tissue abnormality would be expected findings s/p the procedures pt had or if they might represent an infective focus.     Continue broad spectrum antibiotics for now - if CT findings are not considered to be infectious would taper back to patient's planned post-op Augmentin course per home meds.     Post op wound care.   Obesity (BMI 35.0-39.9  without comorbidity)  Body mass index is 38.11 kg/m². Morbid obesity complicates all aspects of disease management from diagnostic modalities to treatment.     Allergic rhinitis  Continue cetirizine and fluticasone.     VTE Risk Mitigation (From admission, onward)           Ordered     enoxaparin injection 40 mg  Every 12 hours         05/18/25 0333     IP VTE HIGH RISK PATIENT  Once         05/18/25 0333     Place sequential compression device  Until discontinued         05/18/25 0333                       On 05/18/2025, patient should be placed in hospital observation services under my care.      Chris Ferguson MD  Department of Hospital Medicine  [facility] - Emergency Dept

## 2025-05-18 NOTE — CARE UPDATE
Hospital Medicine Plan of Care Note    Admission H&P dated earlier this morning reviewed, and agree with assessment and plan as documented. Pt seen and examined this morning on rounds, RASTA. Pain controlled at this time. Dilaudid for breakthrough as needed. Incisions examined on buttocks, breast, and abdomen which are all clean, dry, and intact. No induration, erythema. Spoke with plastic surgery; CT imaging consistent with post-op changes. Will closely monitor for infectious signs and symptoms. Receiving IV iron this AM for iron deficiency anemia. Will continue IV antibiotics today and likely transition back to Augmentin tomorrow pending clinical course.    Rony Horta MD  Attending Physician  Department of Hospital Medicine  5/18/2025

## 2025-05-18 NOTE — SUBJECTIVE & OBJECTIVE
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 No pertinent surgical history.    Review of patient's allergies indicates:  No Known Allergies    No current facility-administered medications on file prior to encounter.     Current Outpatient Medications on File Prior to Encounter   Medication Sig    albuterol-ipratropium (DUO-NEB) 2.5 mg-0.5 mg/3 mL nebulizer solution Take 3 mLs by nebulization every 6 (six) hours as needed for Wheezing. Rescue    amoxicillin-clavulanate 875-125mg (AUGMENTIN) 875-125 mg per tablet Take 1 tablet by mouth 2 (two) times daily.    butalbital-acetaminophen-caffeine -40 mg (FIORICET, ESGIC) -40 mg per tablet Take 1 tablet by mouth every 4 (four) hours as needed.    cetirizine (ZYRTEC) 10 MG tablet Take 10 mg by mouth once daily.    diazePAM (VALIUM) 5 MG tablet Take 2.5-5 mg by mouth every 8 (eight) hours as needed for Anxiety.    ferrous sulfate 325 (65 FE) MG EC tablet Take 325 mg by mouth once daily.    fluticasone furoate-vilanterol (BREO) 200-25 mcg/dose DsDv diskus inhaler Inhale 1 puff into the lungs once daily.    fluticasone propionate (FLONASE) 50 mcg/actuation nasal spray 1 spray (50 mcg total) by Each Nostril route 2 (two) times daily as needed for Rhinitis or Allergies.    oxyCODONE-acetaminophen (PERCOCET) 5-325 mg per tablet Take 1 tablet by mouth every 6 (six) hours as needed.    montelukast (SINGULAIR) 10 mg tablet Take 10 mg by mouth once daily.    [DISCONTINUED] albuterol (PROVENTIL/VENTOLIN HFA) 90 mcg/actuation inhaler Inhale 2 puffs into the lungs.    [DISCONTINUED] benzonatate (TESSALON) 100 MG capsule Take 1 capsule (100 mg total) by mouth 3 (three) times daily as needed for Cough.    [DISCONTINUED] ibuprofen (ADVIL,MOTRIN) 600 MG tablet Take 1 tablet  (600 mg total) by mouth every 6 (six) hours as needed for Pain.    [DISCONTINUED] ibuprofen (ADVIL,MOTRIN) 600 MG tablet Take 1 tablet (600 mg total) by mouth every 6 (six) hours as needed for Pain.    [DISCONTINUED] loratadine 10 mg Cap Take by mouth.    [DISCONTINUED] ondansetron (ZOFRAN-ODT) 4 MG TbDL Take 1 tablet (4 mg total) by mouth every 6 (six) hours as needed (Nausea).    [DISCONTINUED] promethazine-dextromethorphan (PROMETHAZINE-DM) 6.25-15 mg/5 mL Syrp Take 5 mLs by mouth every 4 (four) hours as needed (cough/congestion).     Family History    None       Tobacco Use    Smoking status: Never    Smokeless tobacco: Never   Substance and Sexual Activity    Alcohol use: Yes     Comment: socially    Drug use: Never    Sexual activity: Not on file     Review of Systems   Constitutional:  Positive for activity change. Negative for chills and fever.   HENT:  Negative for trouble swallowing.    Cardiovascular:  Negative for palpitations and leg swelling.   Gastrointestinal:  Positive for abdominal pain. Negative for vomiting.   Genitourinary:  Positive for decreased urine volume.   Neurological:  Negative for dizziness and light-headedness.   Psychiatric/Behavioral:  Negative for confusion.      Objective:     Vital Signs (Most Recent):  Temp: 97.6 °F (36.4 °C) (05/18/25 0410)  Pulse: 94 (05/18/25 0410)  Resp: 20 (05/18/25 0648)  BP: 107/77 (05/18/25 0410)  SpO2: 96 % (05/18/25 0410) Vital Signs (24h Range):  Temp:  [97.6 °F (36.4 °C)-98.6 °F (37 °C)] 97.6 °F (36.4 °C)  Pulse:  [] 94  Resp:  [16-20] 20  SpO2:  [96 %-100 %] 96 %  BP: ()/(53-88) 107/77     Weight: 103.9 kg (229 lb)  Body mass index is 38.11 kg/m².     Physical Exam  Vitals and nursing note reviewed.   Constitutional:       Appearance: She is obese.   HENT:      Head: Normocephalic and atraumatic.   Eyes:      General: No scleral icterus.  Cardiovascular:      Rate and Rhythm: Normal rate and regular rhythm.      Heart sounds: No murmur  heard.  Pulmonary:      Effort: Pulmonary effort is normal. No respiratory distress.      Breath sounds: No wheezing.      Comments: Decreased breath sounds but pt wearing compression garment  Abdominal:      Comments: Abdominal binder in place, 2 drains in suprapubic region w/ serous drainage   Musculoskeletal:      Right lower leg: No edema.      Left lower leg: No edema.   Neurological:      Mental Status: She is alert and oriented to person, place, and time.                Significant Labs: All pertinent labs within the past 24 hours have been reviewed.  CBC:   Recent Labs   Lab 05/17/25 1920 05/17/25 1926 05/18/25  0419   WBC 10.83  --  9.01   HGB 10.0*  --  8.9*   HCT 31.0* 32.7 28.8*     --  212     CMP:   Recent Labs   Lab 05/17/25 1920 05/18/25 0419    138   K 3.5 3.3*    107   CO2 24 21*   GLU 96 102   BUN 14 13   CREATININE 0.7 0.7   CALCIUM 9.1 7.9*   PROT 7.1 6.0   ALBUMIN 2.9* 2.5*   BILITOT 0.9 0.8   ALKPHOS 70 59   AST 63* 54*   ALT 77* 73*   ANIONGAP 10 10       Significant Imaging: I have reviewed all pertinent imaging results/findings within the past 24 hours.    CTA CHEST NON CORONARY (PE STUDIES); CT ABDOMEN PELVIS WITH IV CONTRAST     CLINICAL HISTORY:  Pulmonary embolism (PE) suspected, high prob;; Sepsis;     TECHNIQUE:  CT examination of the chest abdomen and pelvis was performed following the IV administration of 100 mL of Omnipaque 350.  CT examination of the chest was performed via pulmonary arterial angiographic protocol, axial imaging, sagittal and coronal reconstruction imaging as well as axial MIP reconstruction imaging is submitted, single phase postcontrast CT examination of the abdomen and pelvis is submitted, sagittal and coronal reconstruction imaging is submitted, oral contrast was not utilized.     COMPARISON:  None     FINDINGS:  Note is made that the patient is reportedly recent the status post cosmetic surgical procedures.  The entirety of the breasts,  more so on the left, as well as the peripheral aspect of the chest body wall bilaterally is not completely included on the field of view.     There is appearance of air density associated with the subcutaneous fat planes of the right breast that may relate to recent procedure without evidence for well-formed fluid collection, evaluation of the left breast is limited.  There are tubular structures likely relating to drain catheter is entering via the inferior abdominal subcutaneous fat planes, and seen within the subcutaneous fat planes of the lower anterior abdominopelvic wall.  There is haziness within the aforementioned lower anterior abdominopelvic wall that may relate to edema and or inflammation as well as air density likely relating to the recent procedure without evidence for well-formed fluid collection to specifically suggest abscess.  There is appearance of haziness throughout the subcutaneous fat planes of the flank and more prominently the back bilaterally, there are multiple areas of air density seen within the subcutaneous fat planes of the back as well.  There also appears to be a small area of air fluid collection as seen on axial image 59 series 3, measuring 1.4 x 2.7 cm in size, this does not have a thick-walled appearance, may relate to a seroma however correlation for signs or symptoms of infection is needed.     There is no large central saddle type pulmonary embolus.  The pulmonary arterial vasculature demonstrate heterogeneity, the appearance of which is consistent with artifact however diminishes the sensitivity of the examination as does mild motion artifact, small distal pulmonary emboli cannot be excluded otherwise when accounting for limitations of the exam an abnormal pattern of pulmonary arterial filling defects specific for pulmonary emboli is not seen.     The thoracic aorta demonstrates appropriate opacification.  There is no enlarged mediastinal or hilar adenopathy.  There is no  evidence for pericardial effusion.     The lungs demonstrate motion artifact and diminished depth of inspiration, bandlike opacity on the right likely represents artifact.  There is no dense consolidation.  There is no evidence for pleural effusion.  There is no evidence for pneumothorax.     There is a moderate to large hiatal hernia noted.  The stomach otherwise demonstrates nonspecific appearance of fluid and air within the gastric lumen.  The gallbladder surgically absent.  Biliary dilatation may relate to passive biliary dilatation of prior cholecystectomy, is somewhat more prominent than typical, clinical and historical and laboratory correlation is otherwise needed.  There is no additional evidence for acute process of the liver, pancreas, spleen or adrenal glands.     Small renal hypodensities on the right are noted, too small for characterization.  There is no evidence for ureteral calculus or obstructive uropathy or perinephric inflammatory change bilaterally.  The abdominal aorta appears normal in caliber, demonstrates appropriate opacification.  The urinary bladder is incompletely distended.  There is no evidence for acute process of the uterus or adnexa.     There is no evidence for small bowel obstructive process.  The appendix is identified, it does not appear inflamed.  There is no evidence for inflammatory or obstructive process of the colon.  There is no evidence for free intraperitoneal air or intraperitoneal or retroperitoneal abscess.  Findings along the midline of the lower anterior abdominopelvic wall including fat density and air between the rectus abdominus muscles may relate to postoperative change.     As best seen on series 3 axial image 177 there is an area of soft tissue abnormality within the fat planes of the perineum lateral to the rectum on the left, this measures approximately 3.5 x 4.7 cm in size, is of uncertain etiology, benign and malignant etiologies would be in the  differential, clinical and historical correlation is needed.     The visualized osseous structures appear intact.  Chronic changes are noted.     Impression:     There is no large central saddle type pulmonary embolus, sensitivity of the examination is diminished due to artifact, as above and small distal pulmonary emboli cannot be excluded otherwise an abnormal pattern of pulmonary arterial filling defects specific for pulmonary emboli is not seen.     The lungs demonstrate appearance of diminished depth of inspiration and atelectatic change.     Postoperative findings associated with the soft tissues of the right breast, the anterior abdominal wall as well as the subcutaneous soft tissues of the back, as discussed above including areas of haziness and stranding that may relate to edema/inflammation, as well as air density, in addition there is appearance of a small air and fluid collection within the subcutaneous fat planes of the back measuring approximately 1.4 x 2.7 cm in size, this may relate to seroma however correlation for signs or symptoms of infection is otherwise needed, as discussed above.     Soft tissue abnormality within the perineum to the left of the rectum of uncertain etiology, benign and malignant etiologies would be in the differential, clinical and historical correlation is needed.     Biliary dilatation may relate to passive biliary dilatation of prior cholecystectomy, however somewhat more prominent than typically seen, clinical and historical and laboratory correlation is needed.     Moderate to large hiatal hernia.     Additional findings as above.     This report was flagged in Epic as abnormal.        Electronically signed by: Naseem Knott  Date:                                            05/17/2025  Time:                                           23:12      US LOWER EXTREMITY VEINS BILATERAL     CLINICAL HISTORY:  Pain, unspecified     TECHNIQUE:  Duplex and color flow Doppler and  dynamic compression was performed of the bilateral lower extremity veins was performed.     COMPARISON:  None     FINDINGS:  Right thigh veins: The common femoral, femoral, popliteal, upper greater saphenous, and deep femoral veins are patent and free of thrombus. The veins are normally compressible and have normal phasic flow and augmentation response.     Right calf veins: The visualized calf veins are patent.     Left thigh veins: The common femoral, femoral, popliteal, upper greater saphenous, and deep femoral veins are patent and free of thrombus. The veins are normally compressible and have normal phasic flow and augmentation response.     Left calf veins: The visualized calf veins are patent.     Miscellaneous: None     Impression:     No evidence of deep venous thrombosis in either lower extremity.        Electronically signed by: Tc Rivera  Date:                                            05/18/2025  Time:                                           00:31

## 2025-05-18 NOTE — ASSESSMENT & PLAN NOTE
-etiology secondary to recent surgical procedures and operative blood loss  -Add-on iron studies to AM labs - f/u results, patient takes oral iron as outpatient and had normal CBC pre-op.  I suspect she will require IV iron infusions if deficient.  If symptoms persist despite IV iron therapy would discuss with patient PRBC for symptomatic anemia.     Plan  - Monitor serial CBC: Every 12 hours  - Transfuse PRBC if patient becomes hemodynamically unstable, symptomatic or H/H drops below 7/21.  - Patient has not received any PRBC transfusions to date  - Patient's anemia is currently is worsening    Most recent hemoglobin and hematocrit are listed below.  Recent Labs     05/17/25  1920 05/17/25  1926 05/18/25  0419   HGB 10.0*  --  8.9*   HCT 31.0* 32.7 28.8*

## 2025-05-18 NOTE — ED NOTES
Patient going upstairs with all personal belongings. All cell phone, phone chargers, patient belongings going upstairs with the patient. Patient confirmed on telemetry by [facility] room.

## 2025-05-18 NOTE — PROVIDER PROGRESS NOTES - EMERGENCY DEPT.
Encounter Date: 5/17/2025    ED Physician Progress Notes        Physician Note:   AOC @ 2130. Patient presented w/ shortness of breath, and any skin changes, tachycardia in the setting of multiple recent cosmetic surgeries. Concern for possible pulmonary embolism, cellulitis, postoperative fluid collection.  Patient had a breast reduction and tummy tuck and liposuction done an outside facility in [place].  I records available.  She reports accidental nonadherence to her anticoagulation postoperatively due to leaving her Lovenox on the plane.  She also reports 5 days of postoperative constipation    Pending studies include CT chest/abdomen/pelvis    Pending actions include follow up imaging, surgical consultation    Likely disposition is discharged home as this is the patient's preferred dispositioned    Rakan GANNON Sessions    11:39 PM  I discussed the patient with plastic surgery on-call.  They are happy to see the patient in clinic if I place a referral.  They counseled me that the CT of the abdomen pelvis would be unlikely to provide useful information given expected significant postoperative changes this soon after surgery and would likely only cloud management of the patient.  Given that it was ordered by the previous team who had examined the patient, CT was completed prior to my evaluation.  CT to rule out pulmonary embolism was also completed and without evidence of pulmonary embolism.

## 2025-05-18 NOTE — ED PROVIDER NOTES
Encounter Date: 5/17/2025       History     Chief Complaint   Patient presents with    Post-op Problem     Breast reduction and lift, lipo to sides , tummy tuck in [place], feeling sob, lovenox was left on plane, no bm in 5d     HPI  44-year-old female who presents with discomfort after surgery.  She was in [place] and had multiple surgeries done on Wednesday, so 4 days ago.  She had a tummy tuck, liposuction from her back and buttock, breast reduction and lift.  She went back on Thursday and Friday for a massage but reported increased pain.  Reportedly there was an area of dehiscence in the right lower abdomen.  She does have 2 drains in her lower abdomen.  She states after that she got on a plane and flew back here.  She did not leave her Lovenox.  Is taking her Augmentin.  She reports severe pain diffusely, but mostly in the abdomen.  Her drainage has been minimal.  No fevers or chills.  She also reports severe shortness of breath.  She also reports right calf pain and swelling.  No history of DVT or PE.  She reports pain everywhere which includes the chest.  No hemoptysis.  Review of patient's allergies indicates:  No Known Allergies  Past Medical History:   Diagnosis Date    Asthma      History reviewed. No pertinent surgical history.  No family history on file.  Social History[1]  Review of Systems    Physical Exam     Initial Vitals [05/17/25 1752]   BP Pulse Resp Temp SpO2   101/66 108 20 98.6 °F (37 °C) 99 %      MAP       --         Physical Exam    Nursing note and vitals reviewed.  Constitutional: She appears well-developed and well-nourished. No distress.   HENT:   Head: Normocephalic and atraumatic.   Nose: Nose normal.   Eyes: Conjunctivae and EOM are normal. Pupils are equal, round, and reactive to light.   Neck:   Normal range of motion.  Cardiovascular:  Regular rhythm, normal heart sounds and intact distal pulses.     Exam reveals no gallop and no friction rub.       No murmur heard.  Tachycardic    Pulmonary/Chest: Breath sounds normal. No respiratory distress. She has no wheezes. She has no rhonchi. She has no rales.   Abdominal:   Mildly distended.  Diffusely tender.  There was a lower abdominal wound without any obvious dehiscence or bleeding.  However there are several areas along the lower abdomen as well as the right side which are erythematous and warm to the touch   Musculoskeletal:         General: No edema. Normal range of motion.      Cervical back: Normal range of motion.      Comments: Tender over right posterior calf     Neurological: She is alert and oriented to person, place, and time. She has normal strength.   Skin: Capillary refill takes less than 2 seconds.   Able to view the bilateral breasts and there is some bruising around there but no erythema.  The lower abdominal wound has scattered areas of significant induration, erythema, especially the right side and lower back.  Drains in place with serosanguineous drainage.   Psychiatric: She has a normal mood and affect.         ED Course   Procedures  Labs Reviewed   COMPREHENSIVE METABOLIC PANEL - Abnormal       Result Value    Sodium 136      Potassium 3.5      Chloride 102      CO2 24      Glucose 96      BUN 14      Creatinine 0.7      Calcium 9.1      Protein Total 7.1      Albumin 2.9 (*)     Bilirubin Total 0.9      ALP 70      AST 63 (*)     ALT 77 (*)     Anion Gap 10      eGFR >60     URINALYSIS, REFLEX TO URINE CULTURE - Abnormal    Color, UA Yellow      Appearance, UA Hazy (*)     pH, UA 6.0      Spec Grav UA >=1.030 (*)     Protein, UA 1+ (*)     Glucose, UA Negative      Ketones, UA 2+ (*)     Bilirubin, UA Negative      Blood, UA 1+ (*)     Nitrites, UA Negative      Urobilinogen, UA 2.0-3.0 (*)     Leukocyte Esterase, UA 2+ (*)    CBC WITH DIFFERENTIAL - Abnormal    WBC 10.83      RBC 3.44 (*)     HGB 10.0 (*)     HCT 31.0 (*)     MCV 90      MCH 29.1      MCHC 32.3      RDW 13.2      Platelet Count 252      MPV 9.5       Nucleated RBC 0      Neut % 64.1      Lymph % 28.3      Mono % 5.9      Eos % 0.9      Basophil % 0.3      Imm Grans % 0.5      Neut # 6.95      Lymph # 3.06      Mono # 0.64      Eos # 0.10      Baso # 0.03      Imm Grans # 0.05 (*)    URINALYSIS MICROSCOPIC - Abnormal    RBC, UA 5 (*)     WBC, UA 9 (*)     Bacteria, UA Moderate (*)     Squamous Epithelial Cells, UA 26      Hyaline Casts, UA 7 (*)     Microscopic Comment       HEPATITIS C ANTIBODY - Normal    Hep C Ab Interp Non-Reactive     HIV 1 / 2 ANTIBODY - Normal    HIV 1/2 Ag/Ab Non-Reactive     PROCALCITONIN - Normal    Procalcitonin 0.08     TROPONIN I HIGH SENSITIVITY - Normal    Troponin High Sensitive <3     B-TYPE NATRIURETIC PEPTIDE - Normal    BNP <10     CULTURE, BLOOD   CULTURE, BLOOD   HEP C VIRUS HOLD SPECIMEN    Extra Tube Hold for add-ons.     CBC W/ AUTO DIFFERENTIAL    Narrative:     The following orders were created for panel order CBC auto differential.  Procedure                               Abnormality         Status                     ---------                               -----------         ------                     CBC with Differential[[id number]]       Abnormal            Final result                 Please view results for these tests on the individual orders.   GREY TOP URINE HOLD   POCT URINE PREGNANCY    POC Preg Test, Ur Negative       Acceptable Yes            Imaging Results    None          Medications   iohexoL (OMNIPAQUE 350) injection 100 mL (has no administration in time range)   vancomycin 2 g in 0.9% sodium chloride 500 mL IVPB (has no administration in time range)   piperacillin-tazobactam (ZOSYN) 4.5 g in D5W 100 mL IVPB (MB+) (has no administration in time range)   sodium chloride 0.9% bolus 1,000 mL 1,000 mL (has no administration in time range)   HYDROmorphone injection 1 mg (1 mg Intravenous Given 5/17/25 1935)     Medical Decision Making  44-year-old female who presents with multiple issues.   She had significant cosmetic surgery on Wednesday.  Afterward she developed some dehiscence.  Has had some significant shortness of breath since then.  She is not on her Lovenox currently.  Differential includes PE, pain induced shortness of breath or just restriction causing shortness of breath.  She is not hypoxic here but is tachycardic.  She also has some pain in her right calf.  We will get DVT ultrasounds along with a PE study.  I am also concerned for possible cellulitis of some of the areas where she was operated on as there is some erythema and induration.  She is tachycardic but not febrile.  We will get a sepsis workup just based on concern for infection and tachycardia.  Given a dose of IV antibiotics as well as fluids.  Pain meds ordered.  We will have surgery come down and evaluate here.  Signed out pending full workup.    Amount and/or Complexity of Data Reviewed  Labs: ordered.  Radiology: ordered.    Risk  Prescription drug management.                                      Clinical Impression:  Final diagnoses:  [R06.02] SOB (shortness of breath)  [Z13.6] Screening for cardiovascular condition  [R52] Pain       Critical care time spent on the evaluation and treatment of severe organ dysfunction, review of pertinent labs and imaging studies, discussions with consulting providers and discussions with patient/family: 35 minutes.                [1]   Social History  Tobacco Use    Smoking status: Never    Smokeless tobacco: Never   Substance Use Topics    Alcohol use: Yes     Comment: socially    Drug use: Never        Lupe Ochoa MD  05/17/25 [id number]

## 2025-05-18 NOTE — ASSESSMENT & PLAN NOTE
Body mass index is 38.11 kg/m². Morbid obesity complicates all aspects of disease management from diagnostic modalities to treatment.

## 2025-05-18 NOTE — HPI
"43 y/o AAF w/ Asthma, allergic rhinitis, obesity presents to the ED for complaints of dyspnea on exertion.  She reports undergoing multiple cosmetic surgery procedures on 5/14 this past week @ an ambulatory surgery clinic in [place].  Procedures included, tummy tuck, breast reduction, breast lift, liposuction from buttocks & back.  She recovered after surgery in hotel, had a friend accompany her, she reported feeling unwell after surgery and was concerned about an area of wound dehiscence at site of tummy tuck.  She returned to surgical clinic, initially reports they told her no dehischence but states when wound was inspected, there was opening along incision and additional sutures placed.  She has two bulb drains(SABINA?) from site of tummy tuck.      On flight back reports experiencing dyspnea on exertion that has persistent since arrival back to home on Friday.  She feels dehydrated, notes oliguria.  She is worried about her blood counts, pre-op hgb in April [facility] labs 13.4.  Initial hgb here is 10.0  she states she was in surgery for 6 hours, received general anesthesia.   She was given instructions for wound care, 2 week course of augmentin, PRN oxycodone, ativan prescriptions.  She reports she was going to remove surgical drains when output ceased.   She has compression garment for breasts & abdominal binder.     Per ED notes she also experienced right calf pain and swelling, In the ED she had w/u for infection with blood cultures, given doses of Vancomycin and Zosyn, received albuterol nebulizer. Dilaudid 0.5mg/1mg iv, Toradol 15mg IV, Zofran 4mg x1, 1Liter normal saline.     Imaging studies of US Bilat LE venous, CTA Chest PE protocol, CT abd pelvis w/ IV contrast.   CT imaging notable for a fluid collection in soft tissue of back, possibly a seroma and a "Soft tissue abnormality within the perineum to the left of the rectum of uncertain etiology"    Procedure performed @ [facility], [place], [place].   "

## 2025-05-18 NOTE — ASSESSMENT & PLAN NOTE
-s/p CT chest imaging - pt with atelectasis,  her CBC trend overnight shows downtrending hgb new anemia   -no acute wheezing on exam - but will scheduled some nebulizers.   -She is having severe pain with minimal movement in bed and suspect some hypoventilation leading to atelectatic changes on CT given surgical procedure to the back/abdomen and breast  - start incentive spirometry  -workup of anemia as per Acute blood loss anemia problem.

## 2025-05-18 NOTE — ED NOTES
Assumed care of the patient. Report received from TRISH Tolbert. Pt on continuous cardiac monitoring. Pt in hospital gown, side rails up X2, bed low and locked, and call light is placed within reach. No family/visitors at bedside at this time. Pt complaining of 6/10 pain to her surgical sites. Patient assisted by RN to the bathroom. Pt had 1 episode of urination. Patient assisted back in bed.

## 2025-05-18 NOTE — ASSESSMENT & PLAN NOTE
Continue home BREO     Schedule duonebs q6hrs while awake x 2 days.     Do not suspect an isolated asthma exacerbation will hold any glucocorticoid at this time given empiric antibiotics and recent surgical procedures.

## 2025-05-18 NOTE — ASSESSMENT & PLAN NOTE
Patient with abnormal findings on CT scan   -Plastic surgery consulted by ED team, pt reports she was seen overnight. Would f/u with Plastic surgery consult team during day time to review patients imaging and discuss if the abnormal soft tissue fluid collection in the back and the perineal soft tissue abnormality would be expected findings s/p the procedures pt had or if they might represent an infective focus.     Continue broad spectrum antibiotics for now - if CT findings are not considered to be infectious would taper back to patient's planned post-op Augmentin course per home meds.     Post op wound care.

## 2025-05-19 LAB
ABO + RH BLD: NORMAL
ABORH RETYPE: NORMAL
ABSOLUTE EOSINOPHIL (OHS): 0.13 K/UL
ABSOLUTE MONOCYTE (OHS): 0.83 K/UL (ref 0.3–1)
ABSOLUTE NEUTROPHIL COUNT (OHS): 5.11 K/UL (ref 1.8–7.7)
ALBUMIN SERPL BCP-MCNC: 2.4 G/DL (ref 3.5–5.2)
ALP SERPL-CCNC: 53 UNIT/L (ref 40–150)
ALT SERPL W/O P-5'-P-CCNC: 62 UNIT/L (ref 10–44)
ANION GAP (OHS): 8 MMOL/L (ref 8–16)
AORTIC SIZE INDEX (SOV): 1.3 CM/M2
AORTIC SIZE INDEX: 1.3 CM/M2
ASCENDING AORTA: 2.8 CM
AST SERPL-CCNC: 32 UNIT/L (ref 11–45)
BASOPHILS # BLD AUTO: 0.04 K/UL
BASOPHILS NFR BLD AUTO: 0.5 %
BILIRUB SERPL-MCNC: 0.8 MG/DL (ref 0.1–1)
BLD PROD TYP BPU: NORMAL
BLOOD UNIT EXPIRATION DATE: NORMAL
BLOOD UNIT TYPE CODE: 5100
BSA FOR ECHO PROCEDURE: 2.18 M2
BUN SERPL-MCNC: 13 MG/DL (ref 6–20)
CALCIUM SERPL-MCNC: 8.3 MG/DL (ref 8.7–10.5)
CHLORIDE SERPL-SCNC: 106 MMOL/L (ref 95–110)
CO2 SERPL-SCNC: 22 MMOL/L (ref 23–29)
CREAT SERPL-MCNC: 1.1 MG/DL (ref 0.5–1.4)
CROSSMATCH INTERPRETATION: NORMAL
CV ECHO LV RWT: 0.34 CM
DISPENSE STATUS: NORMAL
DOP CALC LVOT AREA: 3.1 CM2
DOP CALC LVOT DIAMETER: 2 CM
ECHO EF ESTIMATED: 63 %
ECHO LV POSTERIOR WALL: 0.7 CM (ref 0.6–1.1)
ERYTHROCYTE [DISTWIDTH] IN BLOOD BY AUTOMATED COUNT: 13.3 % (ref 11.5–14.5)
FRACTIONAL SHORTENING: 34.1 % (ref 28–44)
GFR SERPLBLD CREATININE-BSD FMLA CKD-EPI: >60 ML/MIN/1.73/M2
GLUCOSE SERPL-MCNC: 114 MG/DL (ref 70–110)
HCT VFR BLD AUTO: 26.9 % (ref 37–48.5)
HGB BLD-MCNC: 8.8 GM/DL (ref 12–16)
IMM GRANULOCYTES # BLD AUTO: 0.06 K/UL (ref 0–0.04)
IMM GRANULOCYTES NFR BLD AUTO: 0.8 % (ref 0–0.5)
INDIRECT COOMBS: NORMAL
INTERVENTRICULAR SEPTUM: 0.7 CM (ref 0.6–1.1)
LEFT ATRIUM SIZE: 3.1 CM
LEFT INTERNAL DIMENSION IN SYSTOLE: 2.7 CM (ref 2.1–4)
LEFT VENTRICLE DIASTOLIC VOLUME INDEX: 35.24 ML/M2
LEFT VENTRICLE DIASTOLIC VOLUME: 74 ML
LEFT VENTRICLE MASS INDEX: 38.9 G/M2
LEFT VENTRICLE SYSTOLIC VOLUME INDEX: 13.3 ML/M2
LEFT VENTRICLE SYSTOLIC VOLUME: 28 ML
LEFT VENTRICULAR INTERNAL DIMENSION IN DIASTOLE: 4.1 CM (ref 3.5–6)
LEFT VENTRICULAR MASS: 81.7 G
LYMPHOCYTES # BLD AUTO: 1.64 K/UL (ref 1–4.8)
MAGNESIUM SERPL-MCNC: 1.7 MG/DL (ref 1.6–2.6)
MCH RBC QN AUTO: 30 PG (ref 27–31)
MCHC RBC AUTO-ENTMCNC: 32.7 G/DL (ref 32–36)
MCV RBC AUTO: 92 FL (ref 82–98)
NUCLEATED RBC (/100WBC) (OHS): 0 /100 WBC
PHOSPHATE SERPL-MCNC: 3.4 MG/DL (ref 2.7–4.5)
PLATELET # BLD AUTO: 262 K/UL (ref 150–450)
PMV BLD AUTO: 8.8 FL (ref 9.2–12.9)
POTASSIUM SERPL-SCNC: 3.9 MMOL/L (ref 3.5–5.1)
PROT SERPL-MCNC: 5.9 GM/DL (ref 6–8.4)
RA PRESSURE ESTIMATED: 3 MMHG
RBC # BLD AUTO: 2.93 M/UL (ref 4–5.4)
RELATIVE EOSINOPHIL (OHS): 1.7 %
RELATIVE LYMPHOCYTE (OHS): 21 % (ref 18–48)
RELATIVE MONOCYTE (OHS): 10.6 % (ref 4–15)
RELATIVE NEUTROPHIL (OHS): 65.4 % (ref 38–73)
RH BLD: NORMAL
SINUS: 2.72 CM
SODIUM SERPL-SCNC: 136 MMOL/L (ref 136–145)
SPECIMEN OUTDATE: NORMAL
STJ: 2.6 CM
UNIT NUMBER: NORMAL
WBC # BLD AUTO: 7.81 K/UL (ref 3.9–12.7)
Z-SCORE OF LEFT VENTRICULAR DIMENSION IN END DIASTOLE: -4.65
Z-SCORE OF LEFT VENTRICULAR DIMENSION IN END SYSTOLE: -3.09

## 2025-05-19 PROCEDURE — 36430 TRANSFUSION BLD/BLD COMPNT: CPT

## 2025-05-19 PROCEDURE — 85025 COMPLETE CBC W/AUTO DIFF WBC: CPT | Performed by: HOSPITALIST

## 2025-05-19 PROCEDURE — 84100 ASSAY OF PHOSPHORUS: CPT | Performed by: HOSPITALIST

## 2025-05-19 PROCEDURE — 86850 RBC ANTIBODY SCREEN: CPT | Performed by: INTERNAL MEDICINE

## 2025-05-19 PROCEDURE — 25000003 PHARM REV CODE 250: Performed by: INTERNAL MEDICINE

## 2025-05-19 PROCEDURE — 86920 COMPATIBILITY TEST SPIN: CPT | Performed by: INTERNAL MEDICINE

## 2025-05-19 PROCEDURE — 63600175 PHARM REV CODE 636 W HCPCS: Performed by: INTERNAL MEDICINE

## 2025-05-19 PROCEDURE — 83735 ASSAY OF MAGNESIUM: CPT | Performed by: HOSPITALIST

## 2025-05-19 PROCEDURE — 36415 COLL VENOUS BLD VENIPUNCTURE: CPT | Performed by: HOSPITALIST

## 2025-05-19 PROCEDURE — 36415 COLL VENOUS BLD VENIPUNCTURE: CPT | Performed by: INTERNAL MEDICINE

## 2025-05-19 PROCEDURE — 94761 N-INVAS EAR/PLS OXIMETRY MLT: CPT

## 2025-05-19 PROCEDURE — 96372 THER/PROPH/DIAG INJ SC/IM: CPT | Performed by: HOSPITALIST

## 2025-05-19 PROCEDURE — G0378 HOSPITAL OBSERVATION PER HR: HCPCS

## 2025-05-19 PROCEDURE — P9016 RBC LEUKOCYTES REDUCED: HCPCS | Performed by: INTERNAL MEDICINE

## 2025-05-19 PROCEDURE — 96367 TX/PROPH/DG ADDL SEQ IV INF: CPT

## 2025-05-19 PROCEDURE — 96374 THER/PROPH/DIAG INJ IV PUSH: CPT | Mod: 59

## 2025-05-19 PROCEDURE — 94640 AIRWAY INHALATION TREATMENT: CPT | Mod: XB

## 2025-05-19 PROCEDURE — 30233N1 TRANSFUSION OF NONAUTOLOGOUS RED BLOOD CELLS INTO PERIPHERAL VEIN, PERCUTANEOUS APPROACH: ICD-10-PCS | Performed by: INTERNAL MEDICINE

## 2025-05-19 PROCEDURE — 82040 ASSAY OF SERUM ALBUMIN: CPT | Performed by: HOSPITALIST

## 2025-05-19 PROCEDURE — 96366 THER/PROPH/DIAG IV INF ADDON: CPT

## 2025-05-19 PROCEDURE — 25000003 PHARM REV CODE 250: Performed by: HOSPITALIST

## 2025-05-19 PROCEDURE — 25000242 PHARM REV CODE 250 ALT 637 W/ HCPCS: Performed by: HOSPITALIST

## 2025-05-19 PROCEDURE — 63600175 PHARM REV CODE 636 W HCPCS: Performed by: HOSPITALIST

## 2025-05-19 PROCEDURE — 96376 TX/PRO/DX INJ SAME DRUG ADON: CPT

## 2025-05-19 RX ORDER — AMOXICILLIN AND CLAVULANATE POTASSIUM 875; 125 MG/1; MG/1
1 TABLET, FILM COATED ORAL EVERY 12 HOURS
Status: DISCONTINUED | OUTPATIENT
Start: 2025-05-19 | End: 2025-05-22 | Stop reason: HOSPADM

## 2025-05-19 RX ORDER — ONDANSETRON 8 MG/1
8 TABLET, ORALLY DISINTEGRATING ORAL ONCE
Status: COMPLETED | OUTPATIENT
Start: 2025-05-19 | End: 2025-05-19

## 2025-05-19 RX ORDER — HYDROCODONE BITARTRATE AND ACETAMINOPHEN 500; 5 MG/1; MG/1
TABLET ORAL
Status: DISCONTINUED | OUTPATIENT
Start: 2025-05-19 | End: 2025-05-22 | Stop reason: HOSPADM

## 2025-05-19 RX ORDER — GABAPENTIN 100 MG/1
100 CAPSULE ORAL 3 TIMES DAILY
Status: DISCONTINUED | OUTPATIENT
Start: 2025-05-19 | End: 2025-05-20

## 2025-05-19 RX ADMIN — ENOXAPARIN SODIUM 40 MG: 40 INJECTION SUBCUTANEOUS at 10:05

## 2025-05-19 RX ADMIN — MONTELUKAST 10 MG: 10 TABLET, FILM COATED ORAL at 08:05

## 2025-05-19 RX ADMIN — FLUTICASONE FUROATE AND VILANTEROL TRIFENATATE 1 PUFF: 200; 25 POWDER RESPIRATORY (INHALATION) at 12:05

## 2025-05-19 RX ADMIN — GABAPENTIN 300 MG: 300 CAPSULE ORAL at 08:05

## 2025-05-19 RX ADMIN — ENOXAPARIN SODIUM 40 MG: 40 INJECTION SUBCUTANEOUS at 08:05

## 2025-05-19 RX ADMIN — PIPERACILLIN SODIUM AND TAZOBACTAM SODIUM 4.5 G: 4; .5 INJECTION, POWDER, FOR SOLUTION INTRAVENOUS at 06:05

## 2025-05-19 RX ADMIN — HYDROMORPHONE HYDROCHLORIDE 0.5 MG: 0.5 INJECTION, SOLUTION INTRAMUSCULAR; INTRAVENOUS; SUBCUTANEOUS at 11:05

## 2025-05-19 RX ADMIN — AMOXICILLIN AND CLAVULANATE POTASSIUM 1 TABLET: 875; 125 TABLET, FILM COATED ORAL at 10:05

## 2025-05-19 RX ADMIN — GABAPENTIN 100 MG: 100 CAPSULE ORAL at 10:05

## 2025-05-19 RX ADMIN — OXYCODONE HYDROCHLORIDE 5 MG: 5 TABLET ORAL at 05:05

## 2025-05-19 RX ADMIN — ONDANSETRON 4 MG: 2 INJECTION INTRAMUSCULAR; INTRAVENOUS at 08:05

## 2025-05-19 RX ADMIN — OXYCODONE HYDROCHLORIDE 5 MG: 5 TABLET ORAL at 06:05

## 2025-05-19 RX ADMIN — IPRATROPIUM BROMIDE AND ALBUTEROL SULFATE 3 ML: 2.5; .5 SOLUTION RESPIRATORY (INHALATION) at 12:05

## 2025-05-19 RX ADMIN — ONDANSETRON 8 MG: 8 TABLET, ORALLY DISINTEGRATING ORAL at 12:05

## 2025-05-19 RX ADMIN — SODIUM CHLORIDE 125 MG: 9 INJECTION, SOLUTION INTRAVENOUS at 09:05

## 2025-05-19 RX ADMIN — CETIRIZINE HYDROCHLORIDE 10 MG: 10 TABLET, FILM COATED ORAL at 08:05

## 2025-05-19 RX ADMIN — LACTULOSE 20 G: 20 SOLUTION ORAL at 09:05

## 2025-05-19 RX ADMIN — PIPERACILLIN SODIUM AND TAZOBACTAM SODIUM 4.5 G: 4; .5 INJECTION, POWDER, FOR SOLUTION INTRAVENOUS at 12:05

## 2025-05-19 RX ADMIN — LACTULOSE 20 G: 20 SOLUTION ORAL at 10:05

## 2025-05-19 RX ADMIN — AMOXICILLIN AND CLAVULANATE POTASSIUM 1 TABLET: 875; 125 TABLET, FILM COATED ORAL at 08:05

## 2025-05-19 RX ADMIN — FERROUS SULFATE TAB EC 325 MG (65 MG FE EQUIVALENT) 1 EACH: 325 (65 FE) TABLET DELAYED RESPONSE at 08:05

## 2025-05-19 NOTE — CONSULTS
[facility] VASCULAR ACCESS NOTE       Bed:724/724 A    [facility] consulted for peripheral access.    On arrival to bedside, Pt states RN already obtained PIV.    Please re-consult if needed.    Jonny Pepper RN

## 2025-05-19 NOTE — HOSPITAL COURSE
Patient admitted for symptomatic anemia with symptoms of dyspnea on exertion. Had recent cosmetic surgery in [facility] (BBL/tumfrances góme[place]) and flew back on post op day 2. CTA chest negative for PE. Discussed with plastic surgery, CT imaging consistent with normal post-operative findings. No evidence of infection. Markedly anemic compared to prior labs (13 -> 8.5) likely from acute blood loss from surgery. Iron labs consistent with iron deficiency anemia. Started on IV iron and given 1u pRBC for symptomatic anemia. Added gabapentin for further pain control. Wound care consulted. Developed RLE edema overnight; RLE ultrasound negative for DVT. Receiving daily IV iron and VALERIO slowly improving. At discharge patient able to ambulate to restroom, but feels the need for a rolling walker for distances beyond bathroom. Also requested elevated commode because her toilet at home is too low. Discharged with iron supplementation, pain regimen for post-op pain, advised to take bowel regimen while on those medications, to continue finishing Augmentin as recommended by her Plastic Surgeon and advised PCP follow up.

## 2025-05-19 NOTE — SUBJECTIVE & OBJECTIVE
Interval History: Pt seen and examined this morning on cristela MAGDALENO. Patient admitted for symptomatic anemia with symptoms of dyspnea on exertion. Had recent cosmetic surgery in [facility] (BBL/tummy tuck) and flew back on post op day 2. CTA chest negative for PE. Discussed with plastic surgery, CT imaging consistent with normal post-operative findings. No evidence of infection. Markedly anemic compared to prior labs (13 -> 8.5) likely from acute blood loss from surgery. Iron labs consistent with iron deficiency anemia. Started on IV iron and given 1u pRBC for symptomatic anemia. Added gabapentin for further pain control. Wound care consulted. Care plan reviewed. Otherwise, doing well and with no further complaints at this time.        Objective:     Vital Signs (Most Recent):  Temp: 98.2 °F (36.8 °C) (05/19/25 1158)  Pulse: 103 (05/19/25 1158)  Resp: 18 (05/19/25 1158)  BP: 116/72 (05/19/25 1158)  SpO2: 98 % (05/19/25 1158) Vital Signs (24h Range):  Temp:  [98 °F (36.7 °C)-98.4 °F (36.9 °C)] 98.2 °F (36.8 °C)  Pulse:  [] 103  Resp:  [16-19] 18  SpO2:  [96 %-99 %] 98 %  BP: (105-143)/(56-83) 116/72     Weight: 103.9 kg (229 lb)  Body mass index is 38.11 kg/m².    Intake/Output Summary (Last 24 hours) at 5/19/2025 1215  Last data filed at 5/19/2025 0500  Gross per 24 hour   Intake 300 ml   Output 65 ml   Net 235 ml         Physical Exam  Vitals and nursing note reviewed.   Constitutional:       Appearance: She is obese.   HENT:      Head: Normocephalic and atraumatic.   Eyes:      General: No scleral icterus.  Cardiovascular:      Rate and Rhythm: Normal rate and regular rhythm.      Heart sounds: No murmur heard.  Pulmonary:      Effort: Pulmonary effort is normal. No respiratory distress.      Breath sounds: No wheezing.   Abdominal:      Comments: Abdominal binder in place, 2 drains in suprapubic region w/ serous drainage. Incisions CDI  Musculoskeletal:      Right lower leg: No edema.      Left lower leg: No  edema.   Neurological:      Mental Status: She is alert and oriented to person, place, and time.   Skin: Incisions CDI on breast, abdomen, and buttocks; no sign of erythema          Significant Labs: All pertinent labs within the past 24 hours have been reviewed.    Significant Imaging: I have reviewed all pertinent imaging results/findings within the past 24 hours.

## 2025-05-19 NOTE — ASSESSMENT & PLAN NOTE
-etiology secondary to recent surgical procedures and operative blood loss  -Add-on iron studies to AM labs - f/u results, patient takes oral iron as outpatient and had normal CBC pre-op.  I suspect she will require IV iron infusions if deficient.  If symptoms persist despite IV iron therapy would discuss with patient PRBC for symptomatic anemia.     Plan  - Monitor serial CBC: daily  - Transfuse PRBC if patient becomes hemodynamically unstable, symptomatic or H/H drops below 7/21.  - Patient has not received any PRBC transfusions to date  - Patient's anemia is currently stable  - Will transfuse 1u pRBC for symptomatic anemia  - Continue IV iron and will transition to PO after today    Most recent hemoglobin and hematocrit are listed below.  Recent Labs     05/18/25  0419 05/18/25  1631 05/19/25  0525   HGB 8.9* 8.9* 8.8*   HCT 28.8* 28.2* 26.9*

## 2025-05-19 NOTE — ASSESSMENT & PLAN NOTE
-s/p CT chest imaging - pt with atelectasis, her CBC trend overnight shows downtrending hgb new anemia   -no acute wheezing on exam - but will scheduled some nebulizers.   -She is having severe pain with minimal movement in bed and suspect some hypoventilation leading to atelectatic changes on CT given surgical procedure to the back/abdomen and breast    - start incentive spirometry  - workup of anemia as per Acute blood loss anemia problem.   - TTE pending

## 2025-05-19 NOTE — PROGRESS NOTES
Pharmacokinetic Assessment Follow Up: IV Vancomycin    Therapy with vancomycin complete and/or consult discontinued by provider. Pharmacy will sign off, please re-consult as needed.    Antonio Spann (Lillian), PharmD

## 2025-05-19 NOTE — PROGRESS NOTES
[facility] [address] [place] [address] Medicine  Progress Note    Patient Name: Kianna Beckett  MRN: [id number]  Patient Class: OP- Observation   Admission Date: 5/17/2025  Length of Stay: 0 days  Attending Physician: Rony Horta MD  Primary Care Provider: Peg, Primary Doctor        Subjective     Principal Problem:Dyspnea on exertion        HPI:  43 y/o AAF w/ Asthma, allergic rhinitis, obesity presents to the ED for complaints of dyspnea on exertion.  She reports undergoing multiple cosmetic surgery procedures on 5/14 this past week @ an ambulatory surgery clinic in [place].  Procedures included, tummy tuck, breast reduction, breast lift, liposuction from buttocks & back.  She recovered after surgery in hotel, had a friend accompany her, she reported feeling unwell after surgery and was concerned about an area of wound dehiscence at site of tummy tuck.  She returned to surgical clinic, initially reports they told her no dehischence but states when wound was inspected, there was opening along incision and additional sutures placed.  She has two bulb drains(SABINA?) from site of tummy tuck.      On flight back reports experiencing dyspnea on exertion that has persistent since arrival back to home on Friday.  She feels dehydrated, notes oliguria.  She is worried about her blood counts, pre-op hgb in April [facility] labs 13.4.  Initial hgb here is 10.0  she states she was in surgery for 6 hours, received general anesthesia.   She was given instructions for wound care, 2 week course of augmentin, PRN oxycodone, ativan prescriptions.  She reports she was going to remove surgical drains when output ceased.   She has compression garment for breasts & abdominal binder.     Per ED notes she also experienced right calf pain and swelling, In the ED she had w/u for infection with blood cultures, given doses of Vancomycin and Zosyn, received albuterol nebulizer. Dilaudid 0.5mg/1mg iv, Toradol 15mg IV, Zofran 4mg x1, 1Liter normal  "saline.     Imaging studies of US Bilat LE venous, CTA Chest PE protocol, CT abd pelvis w/ IV contrast.   CT imaging notable for a fluid collection in soft tissue of back, possibly a seroma and a "Soft tissue abnormality within the perineum to the left of the rectum of uncertain etiology"    Procedure performed @ [facility], [place], [place].     Overview/Hospital Course:  Patient admitted for symptomatic anemia with symptoms of dyspnea on exertion. Had recent cosmetic surgery in [facility] (BBL/tummy tuck) and flew back on post op day 2. CTA chest negative for PE. Discussed with plastic surgery, CT imaging consistent with normal post-operative findings. No evidence of infection. Markedly anemic compared to prior labs (13 -> 8.5) likely from acute blood loss from surgery. Iron labs consistent with iron deficiency anemia. Started on IV iron and given 1u pRBC for symptomatic anemia. Added gabapentin for further pain control. Wound care consulted.    Interval History: Pt seen and examined this morning on cristela MAGDALENO. Patient admitted for symptomatic anemia with symptoms of dyspnea on exertion. Had recent cosmetic surgery in [facility] (BBL/tummy tuck) and flew back on post op day 2. CTA chest negative for PE. Discussed with plastic surgery, CT imaging consistent with normal post-operative findings. No evidence of infection. Markedly anemic compared to prior labs (13 -> 8.5) likely from acute blood loss from surgery. Iron labs consistent with iron deficiency anemia. Started on IV iron and given 1u pRBC for symptomatic anemia. Added gabapentin for further pain control. Wound care consulted. Care plan reviewed. Otherwise, doing well and with no further complaints at this time.        Objective:     Vital Signs (Most Recent):  Temp: 98.2 °F (36.8 °C) (05/19/25 1158)  Pulse: 103 (05/19/25 1158)  Resp: 18 (05/19/25 1158)  BP: 116/72 (05/19/25 1158)  SpO2: 98 % (05/19/25 1158) Vital Signs (24h Range):  Temp:  [98 °F (36.7 °C)-98.4 " °F (36.9 °C)] 98.2 °F (36.8 °C)  Pulse:  [] 103  Resp:  [16-19] 18  SpO2:  [96 %-99 %] 98 %  BP: (105-143)/(56-83) 116/72     Weight: 103.9 kg (229 lb)  Body mass index is 38.11 kg/m².    Intake/Output Summary (Last 24 hours) at 5/19/2025 1215  Last data filed at 5/19/2025 0500  Gross per 24 hour   Intake 300 ml   Output 65 ml   Net 235 ml         Physical Exam  Vitals and nursing note reviewed.   Constitutional:       Appearance: She is obese.   HENT:      Head: Normocephalic and atraumatic.   Eyes:      General: No scleral icterus.  Cardiovascular:      Rate and Rhythm: Normal rate and regular rhythm.      Heart sounds: No murmur heard.  Pulmonary:      Effort: Pulmonary effort is normal. No respiratory distress.      Breath sounds: No wheezing.   Abdominal:      Comments: Abdominal binder in place, 2 drains in suprapubic region w/ serous drainage. Incisions CDI  Musculoskeletal:      Right lower leg: No edema.      Left lower leg: No edema.   Neurological:      Mental Status: She is alert and oriented to person, place, and time.   Skin: Incisions CDI on breast, abdomen, and buttocks; no sign of erythema          Significant Labs: All pertinent labs within the past 24 hours have been reviewed.    Significant Imaging: I have reviewed all pertinent imaging results/findings within the past 24 hours.      Assessment & Plan  Dyspnea on exertion  -s/p CT chest imaging - pt with atelectasis, her CBC trend overnight shows downtrending hgb new anemia   -no acute wheezing on exam - but will scheduled some nebulizers.   -She is having severe pain with minimal movement in bed and suspect some hypoventilation leading to atelectatic changes on CT given surgical procedure to the back/abdomen and breast    - start incentive spirometry  - workup of anemia as per Acute blood loss anemia problem.   - TTE pending    Acute blood loss anemia  -etiology secondary to recent surgical procedures and operative blood loss  -Add-on iron  studies to AM labs - f/u results, patient takes oral iron as outpatient and had normal CBC pre-op.  I suspect she will require IV iron infusions if deficient.  If symptoms persist despite IV iron therapy would discuss with patient PRBC for symptomatic anemia.     Plan  - Monitor serial CBC: daily  - Transfuse PRBC if patient becomes hemodynamically unstable, symptomatic or H/H drops below 7/21.  - Patient has not received any PRBC transfusions to date  - Patient's anemia is currently stable  - Will transfuse 1u pRBC for symptomatic anemia  - Continue IV iron and will transition to PO after today    Most recent hemoglobin and hematocrit are listed below.  Recent Labs     05/18/25  0419 05/18/25  1631 05/19/25  0525   HGB 8.9* 8.9* 8.8*   HCT 28.8* 28.2* 26.9*     Moderate persistent asthma without complication  Continue home BREO     Schedule duonebs q6hrs while awake x 2 days.     Do not suspect an isolated asthma exacerbation will hold any glucocorticoid at this time given empiric antibiotics and recent surgical procedures.     S/P cosmetic plastic surgery  Patient with abnormal findings on CT scan   -Plastic surgery consulted by ED team, pt reports she was seen overnight. Would f/u with Plastic surgery consult team during day time to review patients imaging and discuss if the abnormal soft tissue fluid collection in the back and the perineal soft tissue abnormality would be expected findings s/p the procedures pt had or if they might represent an infective focus.     - Discussed with plastic surgery; CT imaging consistent with post-operative findings  - Remains afebrile, no leukocytosis  - Will DC IV antibiotics and continue Augmentin  - Pain control; adding gabapentin  - Post op wound care. Wound care consulted  Obesity (BMI 35.0-39.9 without comorbidity)  Body mass index is 38.11 kg/m². Morbid obesity complicates all aspects of disease management from diagnostic modalities to treatment.     Allergic  rhinitis  Continue cetirizine and fluticasone.     VTE Risk Mitigation (From admission, onward)           Ordered     enoxaparin injection 40 mg  Every 12 hours         05/18/25 0333     IP VTE HIGH RISK PATIENT  Once         05/18/25 0333     Place sequential compression device  Until discontinued         05/18/25 0333                    Discharge Planning   PAUL: 5/20/2025     Code Status: Full Code   Medical Readiness for Discharge Date:                            Rony Horta MD  Department of Hospital Medicine   [facility] - Observation 11H

## 2025-05-19 NOTE — ASSESSMENT & PLAN NOTE
Patient with abnormal findings on CT scan   -Plastic surgery consulted by ED team, pt reports she was seen overnight. Would f/u with Plastic surgery consult team during day time to review patients imaging and discuss if the abnormal soft tissue fluid collection in the back and the perineal soft tissue abnormality would be expected findings s/p the procedures pt had or if they might represent an infective focus.     - Discussed with plastic surgery; CT imaging consistent with post-operative findings  - Remains afebrile, no leukocytosis  - Will DC IV antibiotics and continue Augmentin  - Pain control; adding gabapentin  - Post op wound care. Wound care consulted

## 2025-05-19 NOTE — PLAN OF CARE
[facility]  Initial Discharge Assessment       Primary Care Provider: No, Primary Doctor    Admission Diagnosis: Screening for cardiovascular condition [Z13.6]  SOB (shortness of breath) [R06.02]  Dyspnea on exertion [R06.09]  Pain [R52]  Chest pain [R07.9]    Admission Date: 5/17/2025  Expected Discharge Date: 5/20/2025         Payor: BLUE CROSS BLUE SHIELD / Plan: BCBS ALL OUT OF STATE / Product Type: PPO /     Extended Emergency Contact Information  Primary Emergency Contact: Robert Randle  Mobile Phone: [phone]  Relation: Son    Discharge Plan A: (P) Home  Discharge Plan B: (P) Home      [id number] - [place], [place] - [address] AT [facility] [address] [place] [address]  [place] [address]  Phone: [phone] Fax: [phone]               SW completed Discharge Planning Assessment with patient via bedside. Discharge planning booklet given to patient/family and whiteboard updated with PAUL and phone #. All questions answered.    Patient reported that she will have transportation upon discharge.     Patient reported that she lives alone, and prior to hospitalization she was independent with her ADL's. Patient reported that she is not on dialysis and does not go to a Coumadin clinic.      Patient lives in a two story home; however, reported that she is unsure if she will stay downstairs or upstairs once she is medically stable to discharge.     Discharge Plan A and Plan B have been determined by review of patient's clinical status, future medical and therapeutic needs, and coverage/benefits for post-acute care in coordination with multidisciplinary team members.      Alanna Vicente LMSW  [facility]  Ext. [phone]

## 2025-05-20 PROBLEM — N17.9 AKI (ACUTE KIDNEY INJURY): Status: ACTIVE | Noted: 2025-05-20

## 2025-05-20 LAB
ABSOLUTE EOSINOPHIL (OHS): 0.17 K/UL
ABSOLUTE EOSINOPHIL (OHS): 0.2 K/UL
ABSOLUTE MONOCYTE (OHS): 0.75 K/UL (ref 0.3–1)
ABSOLUTE MONOCYTE (OHS): 0.89 K/UL (ref 0.3–1)
ABSOLUTE NEUTROPHIL COUNT (OHS): 5.21 K/UL (ref 1.8–7.7)
ABSOLUTE NEUTROPHIL COUNT (OHS): 7.04 K/UL (ref 1.8–7.7)
ALBUMIN SERPL BCP-MCNC: 2.3 G/DL (ref 3.5–5.2)
ALP SERPL-CCNC: 56 UNIT/L (ref 40–150)
ALT SERPL W/O P-5'-P-CCNC: 45 UNIT/L (ref 10–44)
ANION GAP (OHS): 9 MMOL/L (ref 8–16)
AST SERPL-CCNC: 23 UNIT/L (ref 11–45)
BASOPHILS # BLD AUTO: 0.02 K/UL
BASOPHILS # BLD AUTO: 0.02 K/UL
BASOPHILS NFR BLD AUTO: 0.2 %
BASOPHILS NFR BLD AUTO: 0.2 %
BILIRUB SERPL-MCNC: 0.7 MG/DL (ref 0.1–1)
BUN SERPL-MCNC: 11 MG/DL (ref 6–20)
CALCIUM SERPL-MCNC: 8.4 MG/DL (ref 8.7–10.5)
CHLORIDE SERPL-SCNC: 104 MMOL/L (ref 95–110)
CK SERPL-CCNC: 41 U/L (ref 20–180)
CO2 SERPL-SCNC: 25 MMOL/L (ref 23–29)
CREAT SERPL-MCNC: 1.3 MG/DL (ref 0.5–1.4)
ERYTHROCYTE [DISTWIDTH] IN BLOOD BY AUTOMATED COUNT: 13.7 % (ref 11.5–14.5)
ERYTHROCYTE [DISTWIDTH] IN BLOOD BY AUTOMATED COUNT: 13.8 % (ref 11.5–14.5)
GFR SERPLBLD CREATININE-BSD FMLA CKD-EPI: 51 ML/MIN/1.73/M2
GLUCOSE SERPL-MCNC: 110 MG/DL (ref 70–110)
HCT VFR BLD AUTO: 26.4 % (ref 37–48.5)
HCT VFR BLD AUTO: 29.6 % (ref 37–48.5)
HGB BLD-MCNC: 8.3 GM/DL (ref 12–16)
HGB BLD-MCNC: 8.9 GM/DL (ref 12–16)
HGB BLD-MCNC: 9.1 GM/DL (ref 12–16)
IMM GRANULOCYTES # BLD AUTO: 0.14 K/UL (ref 0–0.04)
IMM GRANULOCYTES # BLD AUTO: 0.16 K/UL (ref 0–0.04)
IMM GRANULOCYTES NFR BLD AUTO: 1.5 % (ref 0–0.5)
IMM GRANULOCYTES NFR BLD AUTO: 1.7 % (ref 0–0.5)
LYMPHOCYTES # BLD AUTO: 1.97 K/UL (ref 1–4.8)
LYMPHOCYTES # BLD AUTO: 2.08 K/UL (ref 1–4.8)
MAGNESIUM SERPL-MCNC: 1.9 MG/DL (ref 1.6–2.6)
MCH RBC QN AUTO: 28.6 PG (ref 27–31)
MCH RBC QN AUTO: 28.8 PG (ref 27–31)
MCHC RBC AUTO-ENTMCNC: 30.7 G/DL (ref 32–36)
MCHC RBC AUTO-ENTMCNC: 31.4 G/DL (ref 32–36)
MCV RBC AUTO: 92 FL (ref 82–98)
MCV RBC AUTO: 93 FL (ref 82–98)
NUCLEATED RBC (/100WBC) (OHS): 0 /100 WBC
NUCLEATED RBC (/100WBC) (OHS): 0 /100 WBC
PHOSPHATE SERPL-MCNC: 3.8 MG/DL (ref 2.7–4.5)
PLATELET # BLD AUTO: 256 K/UL (ref 150–450)
PLATELET # BLD AUTO: 292 K/UL (ref 150–450)
PMV BLD AUTO: 8.7 FL (ref 9.2–12.9)
PMV BLD AUTO: 8.9 FL (ref 9.2–12.9)
POTASSIUM SERPL-SCNC: 3.7 MMOL/L (ref 3.5–5.1)
PROT SERPL-MCNC: 5.6 GM/DL (ref 6–8.4)
RBC # BLD AUTO: 2.88 M/UL (ref 4–5.4)
RBC # BLD AUTO: 3.18 M/UL (ref 4–5.4)
RELATIVE EOSINOPHIL (OHS): 1.6 %
RELATIVE EOSINOPHIL (OHS): 2.4 %
RELATIVE LYMPHOCYTE (OHS): 20.1 % (ref 18–48)
RELATIVE LYMPHOCYTE (OHS): 23.8 % (ref 18–48)
RELATIVE MONOCYTE (OHS): 8.6 % (ref 4–15)
RELATIVE MONOCYTE (OHS): 9 % (ref 4–15)
RELATIVE NEUTROPHIL (OHS): 62.9 % (ref 38–73)
RELATIVE NEUTROPHIL (OHS): 68 % (ref 38–73)
SODIUM SERPL-SCNC: 138 MMOL/L (ref 136–145)
WBC # BLD AUTO: 10.36 K/UL (ref 3.9–12.7)
WBC # BLD AUTO: 8.29 K/UL (ref 3.9–12.7)

## 2025-05-20 PROCEDURE — 94640 AIRWAY INHALATION TREATMENT: CPT

## 2025-05-20 PROCEDURE — 80053 COMPREHEN METABOLIC PANEL: CPT | Performed by: HOSPITALIST

## 2025-05-20 PROCEDURE — 82550 ASSAY OF CK (CPK): CPT | Performed by: INTERNAL MEDICINE

## 2025-05-20 PROCEDURE — 36415 COLL VENOUS BLD VENIPUNCTURE: CPT | Performed by: HOSPITALIST

## 2025-05-20 PROCEDURE — 83735 ASSAY OF MAGNESIUM: CPT | Performed by: HOSPITALIST

## 2025-05-20 PROCEDURE — 96361 HYDRATE IV INFUSION ADD-ON: CPT

## 2025-05-20 PROCEDURE — 63600175 PHARM REV CODE 636 W HCPCS: Performed by: INTERNAL MEDICINE

## 2025-05-20 PROCEDURE — 36415 COLL VENOUS BLD VENIPUNCTURE: CPT | Performed by: INTERNAL MEDICINE

## 2025-05-20 PROCEDURE — 25000003 PHARM REV CODE 250: Performed by: PHYSICIAN ASSISTANT

## 2025-05-20 PROCEDURE — 25000003 PHARM REV CODE 250: Performed by: HOSPITALIST

## 2025-05-20 PROCEDURE — 21400001 HC TELEMETRY ROOM

## 2025-05-20 PROCEDURE — 85018 HEMOGLOBIN: CPT | Performed by: INTERNAL MEDICINE

## 2025-05-20 PROCEDURE — 25000242 PHARM REV CODE 250 ALT 637 W/ HCPCS: Performed by: INTERNAL MEDICINE

## 2025-05-20 PROCEDURE — 25000003 PHARM REV CODE 250: Performed by: INTERNAL MEDICINE

## 2025-05-20 PROCEDURE — 85025 COMPLETE CBC W/AUTO DIFF WBC: CPT | Performed by: HOSPITALIST

## 2025-05-20 PROCEDURE — 84100 ASSAY OF PHOSPHORUS: CPT | Performed by: HOSPITALIST

## 2025-05-20 RX ORDER — IPRATROPIUM BROMIDE AND ALBUTEROL SULFATE 2.5; .5 MG/3ML; MG/3ML
3 SOLUTION RESPIRATORY (INHALATION) EVERY 6 HOURS PRN
Status: DISCONTINUED | OUTPATIENT
Start: 2025-05-20 | End: 2025-05-22 | Stop reason: HOSPADM

## 2025-05-20 RX ORDER — GABAPENTIN 100 MG/1
100 CAPSULE ORAL 2 TIMES DAILY
Status: DISCONTINUED | OUTPATIENT
Start: 2025-05-20 | End: 2025-05-22 | Stop reason: HOSPADM

## 2025-05-20 RX ORDER — ENOXAPARIN SODIUM 100 MG/ML
40 INJECTION SUBCUTANEOUS EVERY 24 HOURS
Status: DISCONTINUED | OUTPATIENT
Start: 2025-05-20 | End: 2025-05-22 | Stop reason: HOSPADM

## 2025-05-20 RX ORDER — GABAPENTIN 100 MG/1
200 CAPSULE ORAL ONCE
Status: COMPLETED | OUTPATIENT
Start: 2025-05-20 | End: 2025-05-20

## 2025-05-20 RX ORDER — SODIUM CHLORIDE 9 MG/ML
INJECTION, SOLUTION INTRAVENOUS CONTINUOUS
Status: DISCONTINUED | OUTPATIENT
Start: 2025-05-20 | End: 2025-05-20

## 2025-05-20 RX ORDER — SODIUM CHLORIDE 9 MG/ML
INJECTION, SOLUTION INTRAVENOUS CONTINUOUS
Status: ACTIVE | OUTPATIENT
Start: 2025-05-20 | End: 2025-05-20

## 2025-05-20 RX ORDER — GABAPENTIN 300 MG/1
300 CAPSULE ORAL NIGHTLY
Status: DISCONTINUED | OUTPATIENT
Start: 2025-05-20 | End: 2025-05-22 | Stop reason: HOSPADM

## 2025-05-20 RX ADMIN — CETIRIZINE HYDROCHLORIDE 10 MG: 10 TABLET, FILM COATED ORAL at 09:05

## 2025-05-20 RX ADMIN — SODIUM CHLORIDE 125 MG: 9 INJECTION, SOLUTION INTRAVENOUS at 12:05

## 2025-05-20 RX ADMIN — GABAPENTIN 300 MG: 300 CAPSULE ORAL at 08:05

## 2025-05-20 RX ADMIN — GABAPENTIN 100 MG: 100 CAPSULE ORAL at 02:05

## 2025-05-20 RX ADMIN — SODIUM CHLORIDE: 0.9 INJECTION, SOLUTION INTRAVENOUS at 09:05

## 2025-05-20 RX ADMIN — LACTULOSE 20 G: 20 SOLUTION ORAL at 08:05

## 2025-05-20 RX ADMIN — OXYCODONE HYDROCHLORIDE 10 MG: 10 TABLET ORAL at 02:05

## 2025-05-20 RX ADMIN — MONTELUKAST 10 MG: 10 TABLET, FILM COATED ORAL at 09:05

## 2025-05-20 RX ADMIN — IPRATROPIUM BROMIDE AND ALBUTEROL SULFATE 3 ML: 2.5; .5 SOLUTION RESPIRATORY (INHALATION) at 05:05

## 2025-05-20 RX ADMIN — ENOXAPARIN SODIUM 40 MG: 40 INJECTION SUBCUTANEOUS at 05:05

## 2025-05-20 RX ADMIN — GABAPENTIN 200 MG: 100 CAPSULE ORAL at 12:05

## 2025-05-20 RX ADMIN — AMOXICILLIN AND CLAVULANATE POTASSIUM 1 TABLET: 875; 125 TABLET, FILM COATED ORAL at 08:05

## 2025-05-20 RX ADMIN — SENNOSIDES AND DOCUSATE SODIUM 1 TABLET: 50; 8.6 TABLET ORAL at 02:05

## 2025-05-20 RX ADMIN — SODIUM CHLORIDE: 0.9 INJECTION, SOLUTION INTRAVENOUS at 12:05

## 2025-05-20 RX ADMIN — OXYCODONE HYDROCHLORIDE 5 MG: 5 TABLET ORAL at 08:05

## 2025-05-20 RX ADMIN — AMOXICILLIN AND CLAVULANATE POTASSIUM 1 TABLET: 875; 125 TABLET, FILM COATED ORAL at 09:05

## 2025-05-20 RX ADMIN — FERROUS SULFATE TAB EC 325 MG (65 MG FE EQUIVALENT) 1 EACH: 325 (65 FE) TABLET DELAYED RESPONSE at 09:05

## 2025-05-20 RX ADMIN — FLUTICASONE FUROATE AND VILANTEROL TRIFENATATE 1 PUFF: 200; 25 POWDER RESPIRATORY (INHALATION) at 05:05

## 2025-05-20 NOTE — PROGRESS NOTES
Pharmacist Renal Dose Adjustment Note    Kianna Beckett is a 44 y.o. female being treated with the medication Enoxaparin    .9 kg  BMI 38     Patient Data:    Vital Signs (Most Recent):  Temp: 98.4 °F (36.9 °C) (05/20/25 0716)  Pulse: 69 (05/20/25 0716)  Resp: 18 (05/20/25 0716)  BP: (!) 118/57 (05/20/25 0716)  SpO2: 99 % (05/20/25 0716) Vital Signs (72h Range):  Temp:  [97.1 °F (36.2 °C)-98.8 °F (37.1 °C)]   Pulse:  []   Resp:  [16-20]   BP: ()/(53-88)   SpO2:  [96 %-100 %]      Recent Labs   Lab 05/18/25  0419 05/19/25  0525 05/20/25  0506   CREATININE 0.7 1.1 1.3     Serum creatinine: 1.3 mg/dL 05/20/25 0506  Estimated creatinine clearance: 64.7 mL/min    Medication:Enoxaparin 40 mg SQ BID will be changed to Enoxaparin 40 mg SQ daily    Pharmacist's Name: Debora Waddell  Pharmacist's Extension: [phone]

## 2025-05-20 NOTE — PROGRESS NOTES
[facility] - Observation [address] [place] [address] Medicine  Progress Note    Patient Name: Kianna Beckett  MRN: [id number]  Patient Class: IP- Inpatient   Admission Date: 5/17/2025  Length of Stay: 0 days  Attending Physician: Rony Horta MD  Primary Care Provider: Peg, Primary Doctor        Subjective     Principal Problem:Dyspnea on exertion        HPI:  43 y/o AAF w/ Asthma, allergic rhinitis, obesity presents to the ED for complaints of dyspnea on exertion.  She reports undergoing multiple cosmetic surgery procedures on 5/14 this past week @ an ambulatory surgery clinic in [place].  Procedures included, tummy tuck, breast reduction, breast lift, liposuction from buttocks & back.  She recovered after surgery in hotel, had a friend accompany her, she reported feeling unwell after surgery and was concerned about an area of wound dehiscence at site of tummy tuck.  She returned to surgical clinic, initially reports they told her no dehischence but states when wound was inspected, there was opening along incision and additional sutures placed.  She has two bulb drains(SABINA?) from site of tummy tuck.      On flight back reports experiencing dyspnea on exertion that has persistent since arrival back to home on Friday.  She feels dehydrated, notes oliguria.  She is worried about her blood counts, pre-op hgb in [facility] labs 13.4.  Initial hgb here is 10.0  she states she was in surgery for 6 hours, received general anesthesia.   She was given instructions for wound care, 2 week course of augmentin, PRN oxycodone, ativan prescriptions.  She reports she was going to remove surgical drains when output ceased.   She has compression garment for breasts & abdominal binder.     Per ED notes she also experienced right calf pain and swelling, In the ED she had w/u for infection with blood cultures, given doses of Vancomycin and Zosyn, received albuterol nebulizer. Dilaudid 0.5mg/1mg iv, Toradol 15mg IV, Zofran 4mg x1, 1Liter normal  "saline.     Imaging studies of US Bilat LE venous, CTA Chest PE protocol, CT abd pelvis w/ IV contrast.   CT imaging notable for a fluid collection in soft tissue of back, possibly a seroma and a "Soft tissue abnormality within the perineum to the left of the rectum of uncertain etiology"    Procedure performed @ [facility], [place], [place].     Overview/Hospital Course:  Patient admitted for symptomatic anemia with symptoms of dyspnea on exertion. Had recent cosmetic surgery in [facility] (BBL/tummy tuck) and flew back on post op day 2. CTA chest negative for PE. Discussed with plastic surgery, CT imaging consistent with normal post-operative findings. No evidence of infection. Markedly anemic compared to prior labs (13 -> 8.5) likely from acute blood loss from surgery. Iron labs consistent with iron deficiency anemia. Started on IV iron and given 1u pRBC for symptomatic anemia. Added gabapentin for further pain control. Wound care consulted.    Interval History: Pt seen and examined this morning on rounds. RASTA. Hgb 8.3 and then 8.9 on recheck. Reports symptoms improving with blood transfusion. Cr 0.7 -> 1.3; will give IVF today.       Objective:     Vital Signs (Most Recent):  Temp: 98.4 °F (36.9 °C) (05/20/25 0716)  Pulse: 69 (05/20/25 0716)  Resp: 18 (05/20/25 0716)  BP: (!) 118/57 (05/20/25 0716)  SpO2: 99 % (05/20/25 0716) Vital Signs (24h Range):  Temp:  [98.2 °F (36.8 °C)-98.6 °F (37 °C)] 98.4 °F (36.9 °C)  Pulse:  [] 69  Resp:  [18-19] 18  SpO2:  [97 %-100 %] 99 %  BP: ()/(57-79) 118/57     Weight: 103.9 kg (229 lb)  Body mass index is 38.11 kg/m².    Intake/Output Summary (Last 24 hours) at 5/20/2025 1112  Last data filed at 5/20/2025 0936  Gross per 24 hour   Intake 648.75 ml   Output 25 ml   Net 623.75 ml         Physical Exam  Vitals and nursing note reviewed.   Constitutional:       Appearance: She is obese.   HENT:      Head: Normocephalic and atraumatic.   Eyes:      General: No scleral " icterus.  Cardiovascular:      Rate and Rhythm: Normal rate and regular rhythm.      Heart sounds: No murmur heard.  Pulmonary:      Effort: Pulmonary effort is normal. No respiratory distress.      Breath sounds: No wheezing.   Abdominal:      Comments: Abdominal binder in place, 2 drains in suprapubic region w/ serous drainage. Incisions CDI  Musculoskeletal:      Right lower leg: No edema.      Left lower leg: No edema.   Neurological:      Mental Status: She is alert and oriented to person, place, and time.   Skin: Incisions CDI on breast, abdomen, and buttocks; no sign of erythema          Significant Labs: All pertinent labs within the past 24 hours have been reviewed.    Significant Imaging: I have reviewed all pertinent imaging results/findings within the past 24 hours.      Assessment & Plan  Dyspnea on exertion  -s/p CT chest imaging - pt with atelectasis, her CBC trend overnight shows downtrending hgb new anemia   -no acute wheezing on exam - but will scheduled some nebulizers.   -She is having severe pain with minimal movement in bed and suspect some hypoventilation leading to atelectatic changes on CT given surgical procedure to the back/abdomen and breast    - start incentive spirometry  - workup of anemia as per Acute blood loss anemia problem.   - TTE normal  - Improving  - Plan for walk test with pulse ox  Acute blood loss anemia  -etiology secondary to recent surgical procedures and operative blood loss  -Add-on iron studies to AM labs - f/u results, patient takes oral iron as outpatient and had normal CBC pre-op.  I suspect she will require IV iron infusions if deficient.  If symptoms persist despite IV iron therapy would discuss with patient PRBC for symptomatic anemia.     Plan  - Monitor serial CBC: daily  - Transfuse PRBC if patient becomes hemodynamically unstable, symptomatic or H/H drops below 7/21.  - Patient has not received any PRBC transfusions to date  - Patient's anemia is currently  stable  - S/p 1u pRBC for symptomatic anemia on 5/19  - Continue IV iron today    Most recent hemoglobin and hematocrit are listed below.  Recent Labs     05/18/25  1631 05/19/25  0525 05/20/25  0506 05/20/25  0821   HGB 8.9* 8.8* 8.3* 8.9*   HCT 28.2* 26.9* 26.4*  --      MEGHANN (acute kidney injury)  - Cr 0.7 at admission, baseline 0.7  - likely pre-renal in setting of acute blood loss/poor PO intake after surgery  - Start IVF   - Renally dosing all medications  - Avoiding nephrotoxins  - Will continue to monitor on daily labs    Moderate persistent asthma without complication  Continue home BREO     Schedule duonebs q6hrs while awake x 2 days.     Do not suspect an isolated asthma exacerbation will hold any glucocorticoid at this time given empiric antibiotics and recent surgical procedures.     S/P cosmetic plastic surgery  Patient with abnormal findings on CT scan   -Plastic surgery consulted by ED team, pt reports she was seen overnight. Would f/u with Plastic surgery consult team during day time to review patients imaging and discuss if the abnormal soft tissue fluid collection in the back and the perineal soft tissue abnormality would be expected findings s/p the procedures pt had or if they might represent an infective focus.     - Discussed with plastic surgery; CT imaging consistent with post-operative findings  - Remains afebrile, no leukocytosis  - Will DC IV antibiotics and continue Augmentin  - Pain control; adding gabapentin  - Post op wound care. Wound care consulted  Obesity (BMI 35.0-39.9 without comorbidity)  Body mass index is 38.11 kg/m². Morbid obesity complicates all aspects of disease management from diagnostic modalities to treatment.     Allergic rhinitis  Continue cetirizine and fluticasone.     VTE Risk Mitigation (From admission, onward)           Ordered     enoxaparin injection 40 mg  Every 24 hours         05/20/25 0909     IP VTE HIGH RISK PATIENT  Once         05/18/25 0333     Place  sequential compression device  Until discontinued         05/18/25 0333                    Discharge Planning   PAUL: 5/21/2025     Code Status: Full Code   Medical Readiness for Discharge Date:   Discharge Plan A: Home                        Rony Horta MD  Department of Hospital Medicine   [facility] - Observation 11H

## 2025-05-20 NOTE — ASSESSMENT & PLAN NOTE
-etiology secondary to recent surgical procedures and operative blood loss  -Add-on iron studies to AM labs - f/u results, patient takes oral iron as outpatient and had normal CBC pre-op.  I suspect she will require IV iron infusions if deficient.  If symptoms persist despite IV iron therapy would discuss with patient PRBC for symptomatic anemia.     Plan  - Monitor serial CBC: daily  - Transfuse PRBC if patient becomes hemodynamically unstable, symptomatic or H/H drops below 7/21.  - Patient has not received any PRBC transfusions to date  - Patient's anemia is currently stable  - S/p 1u pRBC for symptomatic anemia on 5/19  - Continue IV iron today    Most recent hemoglobin and hematocrit are listed below.  Recent Labs     05/18/25  1631 05/19/25  0525 05/20/25  0506 05/20/25  0821   HGB 8.9* 8.8* 8.3* 8.9*   HCT 28.2* 26.9* 26.4*  --

## 2025-05-20 NOTE — ASSESSMENT & PLAN NOTE
- Cr 0.7 at admission, baseline 0.7  - likely pre-renal in setting of acute blood loss/poor PO intake after surgery  - Start IVF   - Renally dosing all medications  - Avoiding nephrotoxins  - Will continue to monitor on daily labs

## 2025-05-20 NOTE — ASSESSMENT & PLAN NOTE
-s/p CT chest imaging - pt with atelectasis, her CBC trend overnight shows downtrending hgb new anemia   -no acute wheezing on exam - but will scheduled some nebulizers.   -She is having severe pain with minimal movement in bed and suspect some hypoventilation leading to atelectatic changes on CT given surgical procedure to the back/abdomen and breast    - start incentive spirometry  - workup of anemia as per Acute blood loss anemia problem.   - TTE normal  - Improving  - Plan for walk test with pulse ox

## 2025-05-20 NOTE — SUBJECTIVE & OBJECTIVE
Interval History: Pt seen and examined this morning on cristela RASTA. Hgb 8.3 and then 8.9 on recheck. Reports symptoms improving with blood transfusion. Cr 0.7 -> 1.3; will give IVF today.       Objective:     Vital Signs (Most Recent):  Temp: 98.4 °F (36.9 °C) (05/20/25 0716)  Pulse: 69 (05/20/25 0716)  Resp: 18 (05/20/25 0716)  BP: (!) 118/57 (05/20/25 0716)  SpO2: 99 % (05/20/25 0716) Vital Signs (24h Range):  Temp:  [98.2 °F (36.8 °C)-98.6 °F (37 °C)] 98.4 °F (36.9 °C)  Pulse:  [] 69  Resp:  [18-19] 18  SpO2:  [97 %-100 %] 99 %  BP: ()/(57-79) 118/57     Weight: 103.9 kg (229 lb)  Body mass index is 38.11 kg/m².    Intake/Output Summary (Last 24 hours) at 5/20/2025 1112  Last data filed at 5/20/2025 0936  Gross per 24 hour   Intake 648.75 ml   Output 25 ml   Net 623.75 ml         Physical Exam  Vitals and nursing note reviewed.   Constitutional:       Appearance: She is obese.   HENT:      Head: Normocephalic and atraumatic.   Eyes:      General: No scleral icterus.  Cardiovascular:      Rate and Rhythm: Normal rate and regular rhythm.      Heart sounds: No murmur heard.  Pulmonary:      Effort: Pulmonary effort is normal. No respiratory distress.      Breath sounds: No wheezing.   Abdominal:      Comments: Abdominal binder in place, 2 drains in suprapubic region w/ serous drainage. Incisions CDI  Musculoskeletal:      Right lower leg: No edema.      Left lower leg: No edema.   Neurological:      Mental Status: She is alert and oriented to person, place, and time.   Skin: Incisions CDI on breast, abdomen, and buttocks; no sign of erythema          Significant Labs: All pertinent labs within the past 24 hours have been reviewed.    Significant Imaging: I have reviewed all pertinent imaging results/findings within the past 24 hours.

## 2025-05-20 NOTE — CONSULTS
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  Chart reviewed for this encounter.  See flow sheet for findings.    Pt up in the chair and agreeable to care. Incisions to the breasts and the abdominal skin folds. Steri-strips in place. The wounds drain a small amount of serosanguinous drainage. Per pt, the surgeon ordered dry dressings to be changed 2x/day. The sites were cleansed with NS, InterDry applied, and secured with ABD pads and breast/abdominal binders. The SABINA drain sites are intact and dry. Drainage not observed. Pt reports she is to remove the drains at home, but didn't specify when. MD notified. PT educated on the wound care and the importance of turning every 2 hours.     Recommendations:  - Coccyx: cleanse with NS, pat dry and apply a mepilex bordered foam dressing every 2 days    - Breast folds and abdominal fold: cleanse with NS, pat dry and apply ABD pads every shift; can apply InterDry per pt request    - Umbilicus: cleanse with NS, pat dry and apply gauze every shift    - SABINA drain: cleanse with NS, pat dry and apply split gauze daily        05/20/25 1121        Wound 05/17/25 2031 Incision anterior Pelvis   Date First Assessed/Time First Assessed: 05/17/25 2031   Primary Wound Type: Incision  Orientation: anterior  Location: Pelvis   Wound Image    Dressing Appearance Intact;Moist drainage   Drainage Amount Small   Drainage Characteristics/Odor Serosanguineous   Appearance Other (see  comments)  (steri strips; dry)   Periwound Area Intact;Dry   Care Cleansed with:;Sterile normal saline   Dressing Changed;Other (comment)  (abd pads)        Wound 05/20/25 1121 Moisture associated dermatitis Coccyx   Date First Assessed/Time First Assessed: 05/20/25 1121   Present on Original Admission: No  Primary Wound Type: Moisture associated dermatitis  Location: Coccyx   Wound Image    Dressing Appearance Open to air   Drainage Amount Scant   Appearance Red;Moist   Tissue loss description Partial thickness   Periwound Area Intact;Dry   Care Cleansed with:;Sterile normal saline   Dressing Applied;Foam        Wound 05/20/25 1121 Incision Breast   Date First Assessed/Time First Assessed: 05/20/25 1121   Present on Original Admission: Yes  Primary Wound Type: Incision  Location: Breast   Dressing Appearance Intact;Moist drainage   Drainage Amount Small   Drainage Characteristics/Odor Serosanguineous   Appearance Other (see comments);Pink  (steri strips)   Periwound Area Intact;Dry   Care Cleansed with:;Sterile normal saline   Dressing Changed;Other (comment);Applied;Wound pouch  (ABD pads; InterDry)     Recommendations made to primary team for above plan via secure chat. Wound care will follow-up as needed.   05/20/2025         [1]   Social History  Socioeconomic History    Marital status: Single   Tobacco Use    Smoking status: Never    Smokeless tobacco: Never   Substance and Sexual Activity    Alcohol use: Yes     Comment: socially    Drug use: Never     Social Drivers of Health     Financial Resource Strain: Low Risk  (5/19/2025)    Overall Financial Resource Strain (CARDIA)     Difficulty of Paying Living Expenses: Not hard at all   Food Insecurity: No Food Insecurity (5/19/2025)    Hunger Vital Sign     Worried About Running Out of Food in the Last Year: Never true     Ran Out of Food in the Last Year: Never true   Transportation Needs: No Transportation Needs (5/19/2025)    PRAPARE - Transportation     Lack  of Transportation (Medical): No     Lack of Transportation (Non-Medical): No   Stress: No Stress Concern Present (5/19/2025)    [employer] [place] [employer] - Occupational Stress Questionnaire     Feeling of Stress : Not at all   Housing Stability: Low Risk  (5/19/2025)    Housing Stability Vital Sign     Unable to Pay for Housing in the Last Year: No     Homeless in the Last Year: No

## 2025-05-21 LAB
ABSOLUTE EOSINOPHIL (OHS): 0.21 K/UL
ABSOLUTE MONOCYTE (OHS): 0.86 K/UL (ref 0.3–1)
ABSOLUTE NEUTROPHIL COUNT (OHS): 6.76 K/UL (ref 1.8–7.7)
ALBUMIN SERPL BCP-MCNC: 2.3 G/DL (ref 3.5–5.2)
ALP SERPL-CCNC: 54 UNIT/L (ref 40–150)
ALT SERPL W/O P-5'-P-CCNC: 35 UNIT/L (ref 10–44)
ANION GAP (OHS): 8 MMOL/L (ref 8–16)
AST SERPL-CCNC: 17 UNIT/L (ref 11–45)
BASOPHILS # BLD AUTO: 0.02 K/UL
BASOPHILS NFR BLD AUTO: 0.2 %
BILIRUB SERPL-MCNC: 0.5 MG/DL (ref 0.1–1)
BUN SERPL-MCNC: 10 MG/DL (ref 6–20)
CALCIUM SERPL-MCNC: 8.4 MG/DL (ref 8.7–10.5)
CHLORIDE SERPL-SCNC: 104 MMOL/L (ref 95–110)
CO2 SERPL-SCNC: 25 MMOL/L (ref 23–29)
CREAT SERPL-MCNC: 1.2 MG/DL (ref 0.5–1.4)
ERYTHROCYTE [DISTWIDTH] IN BLOOD BY AUTOMATED COUNT: 13.9 % (ref 11.5–14.5)
GFR SERPLBLD CREATININE-BSD FMLA CKD-EPI: 57 ML/MIN/1.73/M2
GLUCOSE SERPL-MCNC: 103 MG/DL (ref 70–110)
HCT VFR BLD AUTO: 26.5 % (ref 37–48.5)
HGB BLD-MCNC: 8.4 GM/DL (ref 12–16)
IMM GRANULOCYTES # BLD AUTO: 0.18 K/UL (ref 0–0.04)
IMM GRANULOCYTES NFR BLD AUTO: 1.8 % (ref 0–0.5)
LYMPHOCYTES # BLD AUTO: 2.11 K/UL (ref 1–4.8)
MAGNESIUM SERPL-MCNC: 1.9 MG/DL (ref 1.6–2.6)
MCH RBC QN AUTO: 29.2 PG (ref 27–31)
MCHC RBC AUTO-ENTMCNC: 31.7 G/DL (ref 32–36)
MCV RBC AUTO: 92 FL (ref 82–98)
NUCLEATED RBC (/100WBC) (OHS): 0 /100 WBC
PHOSPHATE SERPL-MCNC: 3.4 MG/DL (ref 2.7–4.5)
PLATELET # BLD AUTO: 274 K/UL (ref 150–450)
PMV BLD AUTO: 8.7 FL (ref 9.2–12.9)
POTASSIUM SERPL-SCNC: 3.5 MMOL/L (ref 3.5–5.1)
PROT SERPL-MCNC: 5.9 GM/DL (ref 6–8.4)
RBC # BLD AUTO: 2.88 M/UL (ref 4–5.4)
RELATIVE EOSINOPHIL (OHS): 2.1 %
RELATIVE LYMPHOCYTE (OHS): 20.8 % (ref 18–48)
RELATIVE MONOCYTE (OHS): 8.5 % (ref 4–15)
RELATIVE NEUTROPHIL (OHS): 66.6 % (ref 38–73)
SODIUM SERPL-SCNC: 137 MMOL/L (ref 136–145)
WBC # BLD AUTO: 10.14 K/UL (ref 3.9–12.7)

## 2025-05-21 PROCEDURE — 36415 COLL VENOUS BLD VENIPUNCTURE: CPT | Performed by: HOSPITALIST

## 2025-05-21 PROCEDURE — 94761 N-INVAS EAR/PLS OXIMETRY MLT: CPT

## 2025-05-21 PROCEDURE — 94640 AIRWAY INHALATION TREATMENT: CPT

## 2025-05-21 PROCEDURE — 25000242 PHARM REV CODE 250 ALT 637 W/ HCPCS: Performed by: INTERNAL MEDICINE

## 2025-05-21 PROCEDURE — 63600175 PHARM REV CODE 636 W HCPCS: Performed by: HOSPITALIST

## 2025-05-21 PROCEDURE — 85025 COMPLETE CBC W/AUTO DIFF WBC: CPT | Performed by: HOSPITALIST

## 2025-05-21 PROCEDURE — 99900035 HC TECH TIME PER 15 MIN (STAT)

## 2025-05-21 PROCEDURE — 84100 ASSAY OF PHOSPHORUS: CPT | Performed by: HOSPITALIST

## 2025-05-21 PROCEDURE — 21400001 HC TELEMETRY ROOM

## 2025-05-21 PROCEDURE — 83735 ASSAY OF MAGNESIUM: CPT | Performed by: HOSPITALIST

## 2025-05-21 PROCEDURE — 82040 ASSAY OF SERUM ALBUMIN: CPT | Performed by: HOSPITALIST

## 2025-05-21 PROCEDURE — 94799 UNLISTED PULMONARY SVC/PX: CPT

## 2025-05-21 PROCEDURE — 25000003 PHARM REV CODE 250: Performed by: INTERNAL MEDICINE

## 2025-05-21 PROCEDURE — 63600175 PHARM REV CODE 636 W HCPCS: Performed by: INTERNAL MEDICINE

## 2025-05-21 PROCEDURE — 25000003 PHARM REV CODE 250: Performed by: HOSPITALIST

## 2025-05-21 RX ORDER — ONDANSETRON 4 MG/1
4 TABLET, ORALLY DISINTEGRATING ORAL ONCE
Status: COMPLETED | OUTPATIENT
Start: 2025-05-21 | End: 2025-05-21

## 2025-05-21 RX ADMIN — AMOXICILLIN AND CLAVULANATE POTASSIUM 1 TABLET: 875; 125 TABLET, FILM COATED ORAL at 10:05

## 2025-05-21 RX ADMIN — IPRATROPIUM BROMIDE AND ALBUTEROL SULFATE 3 ML: 2.5; .5 SOLUTION RESPIRATORY (INHALATION) at 01:05

## 2025-05-21 RX ADMIN — ENOXAPARIN SODIUM 40 MG: 40 INJECTION SUBCUTANEOUS at 04:05

## 2025-05-21 RX ADMIN — SODIUM CHLORIDE 125 MG: 9 INJECTION, SOLUTION INTRAVENOUS at 11:05

## 2025-05-21 RX ADMIN — FLUTICASONE FUROATE AND VILANTEROL TRIFENATATE 1 PUFF: 200; 25 POWDER RESPIRATORY (INHALATION) at 10:05

## 2025-05-21 RX ADMIN — OXYCODONE HYDROCHLORIDE 10 MG: 10 TABLET ORAL at 04:05

## 2025-05-21 RX ADMIN — GABAPENTIN 100 MG: 100 CAPSULE ORAL at 10:05

## 2025-05-21 RX ADMIN — OXYCODONE HYDROCHLORIDE 10 MG: 10 TABLET ORAL at 08:05

## 2025-05-21 RX ADMIN — ONDANSETRON 4 MG: 2 INJECTION INTRAMUSCULAR; INTRAVENOUS at 08:05

## 2025-05-21 RX ADMIN — AMOXICILLIN AND CLAVULANATE POTASSIUM 1 TABLET: 875; 125 TABLET, FILM COATED ORAL at 09:05

## 2025-05-21 RX ADMIN — FERROUS SULFATE TAB EC 325 MG (65 MG FE EQUIVALENT) 1 EACH: 325 (65 FE) TABLET DELAYED RESPONSE at 10:05

## 2025-05-21 RX ADMIN — MONTELUKAST 10 MG: 10 TABLET, FILM COATED ORAL at 10:05

## 2025-05-21 RX ADMIN — CETIRIZINE HYDROCHLORIDE 10 MG: 10 TABLET, FILM COATED ORAL at 10:05

## 2025-05-21 RX ADMIN — GABAPENTIN 100 MG: 100 CAPSULE ORAL at 03:05

## 2025-05-21 RX ADMIN — GABAPENTIN 300 MG: 300 CAPSULE ORAL at 09:05

## 2025-05-21 RX ADMIN — ONDANSETRON 4 MG: 4 TABLET, ORALLY DISINTEGRATING ORAL at 10:05

## 2025-05-21 NOTE — SUBJECTIVE & OBJECTIVE
Interval History: Pt seen and examined this morning on rounds. RASTA. Developed RLE edema overnight. RLE ultrasound this morning negative for DVT. Receiving daily IV iron and VALERIO slowly improving.       Objective:     Vital Signs (Most Recent):  Temp: 97.9 °F (36.6 °C) (05/21/25 1057)  Pulse: 96 (05/21/25 1057)  Resp: 16 (05/21/25 1057)  BP: 105/71 (05/21/25 1057)  SpO2: 96 % (05/21/25 1057) Vital Signs (24h Range):  Temp:  [97.8 °F (36.6 °C)-99 °F (37.2 °C)] 97.9 °F (36.6 °C)  Pulse:  [] 96  Resp:  [16-20] 16  SpO2:  [96 %-100 %] 96 %  BP: (102-124)/(58-77) 105/71     Weight: 103.9 kg (229 lb)  Body mass index is 38.11 kg/m².    Intake/Output Summary (Last 24 hours) at 5/21/2025 1107  Last data filed at 5/21/2025 0542  Gross per 24 hour   Intake --   Output 70 ml   Net -70 ml         Physical Exam  Vitals and nursing note reviewed.   Constitutional:       Appearance: She is obese.   HENT:      Head: Normocephalic and atraumatic.   Eyes:      General: No scleral icterus.  Cardiovascular:      Rate and Rhythm: Normal rate and regular rhythm.      Heart sounds: No murmur heard.  Pulmonary:      Effort: Pulmonary effort is normal. No respiratory distress.      Breath sounds: No wheezing.   Abdominal:      Comments: Abdominal binder in place, 2 drains in suprapubic region w/ serous drainage. Incisions CDI  Musculoskeletal:      Right lower leg: No edema.      Left lower leg: No edema.   Neurological:      Mental Status: She is alert and oriented to person, place, and time.   Skin: Incisions CDI on breast, abdomen, and buttocks; no sign of erythema          Significant Labs: All pertinent labs within the past 24 hours have been reviewed.    Significant Imaging: I have reviewed all pertinent imaging results/findings within the past 24 hours.

## 2025-05-21 NOTE — ASSESSMENT & PLAN NOTE
- Cr 0.7 at admission, baseline 0.7  - likely pre-renal in setting of acute blood loss/poor PO intake after surgery  - Renally dosing all medications  - Avoiding nephrotoxins  - Will continue to monitor on daily labs  - Urine studies ordered  - mIVF

## 2025-05-21 NOTE — PLAN OF CARE
Problem: Adult Inpatient Plan of Care  Goal: Plan of Care Review  Outcome: Progressing     Problem: Wound  Goal: Optimal Coping  Outcome: Progressing     Problem: Skin Injury Risk Increased  Goal: Skin Health and Integrity  Outcome: Progressing

## 2025-05-21 NOTE — CONSULTS
[facility] VASCULAR ACCESS NOTE       Bed[facility]724[facility]724 A    [facility] for [facility] nurse able to obtain [facility]     Demetria Faulkner RN

## 2025-05-21 NOTE — PROGRESS NOTES
[facility] [address] [place] [address] Medicine  Progress Note    Patient Name: Kianna Beckett  MRN: [id number]  Patient Class: IP- Inpatient   Admission Date: 5/17/2025  Length of Stay: 1 days  Attending Physician: Rony Horta MD  Primary Care Provider: Peg, Primary Doctor        Subjective     Principal Problem:Dyspnea on exertion        HPI:  43 y/o AAF w/ Asthma, allergic rhinitis, obesity presents to the ED for complaints of dyspnea on exertion.  She reports undergoing multiple cosmetic surgery procedures on 5/14 this past week @ an ambulatory surgery clinic in [place].  Procedures included, tummy tuck, breast reduction, breast lift, liposuction from buttocks & back.  She recovered after surgery in hotel, had a friend accompany her, she reported feeling unwell after surgery and was concerned about an area of wound dehiscence at site of tummy tuck.  She returned to surgical clinic, initially reports they told her no dehischence but states when wound was inspected, there was opening along incision and additional sutures placed.  She has two bulb drains(SABINA?) from site of tummy tuck.      On flight back reports experiencing dyspnea on exertion that has persistent since arrival back to home on Friday.  She feels dehydrated, notes oliguria.  She is worried about her blood counts, pre-op hgb in April [facility] labs 13.4.  Initial hgb here is 10.0  she states she was in surgery for 6 hours, received general anesthesia.   She was given instructions for wound care, 2 week course of augmentin, PRN oxycodone, ativan prescriptions.  She reports she was going to remove surgical drains when output ceased.   She has compression garment for breasts & abdominal binder.     Per ED notes she also experienced right calf pain and swelling, In the ED she had w/u for infection with blood cultures, given doses of Vancomycin and Zosyn, received albuterol nebulizer. Dilaudid 0.5mg/1mg iv, Toradol 15mg IV, Zofran 4mg x1, 1Liter normal  "saline.     Imaging studies of US Bilat LE venous, CTA Chest PE protocol, CT abd pelvis w/ IV contrast.   CT imaging notable for a fluid collection in soft tissue of back, possibly a seroma and a "Soft tissue abnormality within the perineum to the left of the rectum of uncertain etiology"    Procedure performed @ [facility], [place], [place]     Overview/Hospital Course:  Patient admitted for symptomatic anemia with symptoms of dyspnea on exertion. Had recent cosmetic surgery in [facility]\"" (BBL/tummy tuck) and flew back on post op day 2. CTA chest negative for PE. Discussed with plastic surgery, CT imaging consistent with normal post-operative findings. No evidence of infection. Markedly anemic compared to prior labs (13 -> 8.5) likely from acute blood loss from surgery. Iron labs consistent with iron deficiency anemia. Started on IV iron and given 1u pRBC for symptomatic anemia. Added gabapentin for further pain control. Wound care consulted. Developed RLE edema overnight; RLE ultrasound negative for DVT. Receiving daily IV iron and VALERIO slowly improving.     Interval History: Pt seen and examined this morning on rounds. RASTA. Developed RLE edema overnight. RLE ultrasound this morning negative for DVT. Receiving daily IV iron and VALERIO slowly improving. Pain well controlled on current regimen.      Objective:     Vital Signs (Most Recent):  Temp: 97.9 °F (36.6 °C) (05/21/25 1057)  Pulse: 96 (05/21/25 1057)  Resp: 16 (05/21/25 1057)  BP: 105/71 (05/21/25 1057)  SpO2: 96 % (05/21/25 1057) Vital Signs (24h Range):  Temp:  [97.8 °F (36.6 °C)-99 °F (37.2 °C)] 97.9 °F (36.6 °C)  Pulse:  [] 96  Resp:  [16-20] 16  SpO2:  [96 %-100 %] 96 %  BP: (102-124)/(58-77) 105/71     Weight: 103.9 kg (229 lb)  Body mass index is 38.11 kg/m².    Intake/Output Summary (Last 24 hours) at 5/21/2025 1107  Last data filed at 5/21/2025 0542  Gross per 24 hour   Intake --   Output 70 ml   Net -70 ml         Physical Exam  Vitals and nursing " note reviewed.   Constitutional:       Appearance: She is obese.   HENT:      Head: Normocephalic and atraumatic.   Eyes:      General: No scleral icterus.  Cardiovascular:      Rate and Rhythm: Normal rate and regular rhythm.      Heart sounds: No murmur heard.  Pulmonary:      Effort: Pulmonary effort is normal. No respiratory distress.      Breath sounds: No wheezing.   Abdominal:      Comments: Abdominal binder in place, 2 drains in suprapubic region w/ serous drainage. Incisions CDI  Musculoskeletal:      Right lower leg: No edema.      Left lower leg: No edema.   Neurological:      Mental Status: She is alert and oriented to person, place, and time.   Skin: Incisions CDI on breast, abdomen, and buttocks; no sign of erythema          Significant Labs: All pertinent labs within the past 24 hours have been reviewed.    Significant Imaging: I have reviewed all pertinent imaging results/findings within the past 24 hours.      Assessment & Plan  Dyspnea on exertion  -s/p CT chest imaging - pt with atelectasis, her CBC trend overnight shows downtrending hgb new anemia   -no acute wheezing on exam - but will scheduled some nebulizers.   -She is having severe pain with minimal movement in bed and suspect some hypoventilation leading to atelectatic changes on CT given surgical procedure to the back/abdomen and breast    - start incentive spirometry  - workup of anemia as per Acute blood loss anemia problem.   - TTE normal  - Improving  - Plan for walk test with pulse ox    Acute blood loss anemia  -etiology secondary to recent surgical procedures and operative blood loss  -Add-on iron studies to AM labs - f/u results, patient takes oral iron as outpatient and had normal CBC pre-op.  I suspect she will require IV iron infusions if deficient.  If symptoms persist despite IV iron therapy would discuss with patient PRBC for symptomatic anemia.     Plan  - Monitor serial CBC: daily  - Transfuse PRBC if patient becomes  hemodynamically unstable, symptomatic or H/H drops below 7/21.  - Patient has not received any PRBC transfusions to date  - Patient's anemia is currently stable  - S/p 1u pRBC for symptomatic anemia on 5/19  - Continue IV iron today and tomorrow  - Likely can DC 5/22    Most recent hemoglobin and hematocrit are listed below.  Recent Labs     05/20/25  0506 05/20/25  0821 05/20/25  1454 05/21/25  0516   HGB 8.3* 8.9* 9.1* 8.4*   HCT 26.4*  --  29.6* 26.5*     MEGHANN (acute kidney injury)  - Cr 0.7 at admission, baseline 0.7  - likely pre-renal in setting of acute blood loss/poor PO intake after surgery  - Renally dosing all medications  - Avoiding nephrotoxins  - Will continue to monitor on daily labs  - Urine studies ordered  - mIVF    Moderate persistent asthma without complication  Continue home BREO     Schedule duonebs q6hrs while awake x 2 days.     Do not suspect an isolated asthma exacerbation will hold any glucocorticoid at this time given empiric antibiotics and recent surgical procedures.     S/P cosmetic plastic surgery  Patient with abnormal findings on CT scan   -Plastic surgery consulted by ED team, pt reports she was seen overnight. Would f/u with Plastic surgery consult team during day time to review patients imaging and discuss if the abnormal soft tissue fluid collection in the back and the perineal soft tissue abnormality would be expected findings s/p the procedures pt had or if they might represent an infective focus.     - Discussed with plastic surgery; CT imaging consistent with post-operative findings  - Remains afebrile, no leukocytosis  - Will DC IV antibiotics and continue Augmentin  - Pain control; adding gabapentin  - Post op wound care. Wound care consulted  Obesity (BMI 35.0-39.9 without comorbidity)  Body mass index is 38.11 kg/m². Morbid obesity complicates all aspects of disease management from diagnostic modalities to treatment.     Allergic rhinitis  Continue cetirizine and  fluticasone.     VTE Risk Mitigation (From admission, onward)           Ordered     enoxaparin injection 40 mg  Every 24 hours         05/20/25 0909     IP VTE HIGH RISK PATIENT  Once         05/18/25 0333     Place sequential compression device  Until discontinued         05/18/25 0333                    Discharge Planning   PAUL: 5/22/2025     Code Status: Full Code   Medical Readiness for Discharge Date:   Discharge Plan A: Home                        Rony Horta MD  Department of Hospital Medicine   [facility] - Observation 11H

## 2025-05-21 NOTE — PLAN OF CARE
Problem: Adult Inpatient Plan of Care  Goal: Plan of Care Review  Outcome: Progressing  Goal: Patient-Specific Goal (Individualized)  Outcome: Progressing  Goal: Absence of Hospital-Acquired Illness or Injury  Outcome: Progressing  Goal: Optimal Comfort and Wellbeing  Outcome: Progressing  Goal: Readiness for Transition of Care  Outcome: Progressing     Problem: Infection  Goal: Absence of Infection Signs and Symptoms  Outcome: Progressing     Problem: Wound  Goal: Optimal Coping  Outcome: Progressing  Goal: Optimal Functional Ability  Outcome: Progressing  Goal: Absence of Infection Signs and Symptoms  Outcome: Progressing  Goal: Improved Oral Intake  Outcome: Progressing  Goal: Optimal Pain Control and Function  Outcome: Progressing  Goal: Skin Health and Integrity  Outcome: Progressing  Goal: Optimal Wound Healing  Outcome: Progressing     Problem: Skin Injury Risk Increased  Goal: Skin Health and Integrity  Outcome: Progressing     Problem: Acute Kidney Injury/Impairment  Goal: Fluid and Electrolyte Balance  Outcome: Progressing  Goal: Improved Oral Intake  Outcome: Progressing  Goal: Effective Renal Function  Outcome: Progressing

## 2025-05-21 NOTE — ASSESSMENT & PLAN NOTE
-etiology secondary to recent surgical procedures and operative blood loss  -Add-on iron studies to AM labs - f/u results, patient takes oral iron as outpatient and had normal CBC pre-op.  I suspect she will require IV iron infusions if deficient.  If symptoms persist despite IV iron therapy would discuss with patient PRBC for symptomatic anemia.     Plan  - Monitor serial CBC: daily  - Transfuse PRBC if patient becomes hemodynamically unstable, symptomatic or H/H drops below 7/21.  - Patient has not received any PRBC transfusions to date  - Patient's anemia is currently stable  - S/p 1u pRBC for symptomatic anemia on 5/19  - Continue IV iron today and tomorrow  - Likely can DC 5/22    Most recent hemoglobin and hematocrit are listed below.  Recent Labs     05/20/25  0506 05/20/25  0821 05/20/25  1454 05/21/25  0516   HGB 8.3* 8.9* 9.1* 8.4*   HCT 26.4*  --  29.6* 26.5*

## 2025-05-22 VITALS
SYSTOLIC BLOOD PRESSURE: 110 MMHG | RESPIRATION RATE: 19 BRPM | HEIGHT: 65 IN | OXYGEN SATURATION: 96 % | TEMPERATURE: 100 F | WEIGHT: 229 LBS | BODY MASS INDEX: 38.15 KG/M2 | HEART RATE: 92 BPM | DIASTOLIC BLOOD PRESSURE: 73 MMHG

## 2025-05-22 LAB
ABSOLUTE EOSINOPHIL (OHS): 0.27 K/UL
ABSOLUTE MONOCYTE (OHS): 0.76 K/UL (ref 0.3–1)
ABSOLUTE NEUTROPHIL COUNT (OHS): 7.89 K/UL (ref 1.8–7.7)
ANION GAP (OHS): 10 MMOL/L (ref 8–16)
BACTERIA BLD CULT: NORMAL
BACTERIA BLD CULT: NORMAL
BASOPHILS # BLD AUTO: 0.02 K/UL
BASOPHILS NFR BLD AUTO: 0.2 %
BUN SERPL-MCNC: 9 MG/DL (ref 6–20)
CALCIUM SERPL-MCNC: 8.5 MG/DL (ref 8.7–10.5)
CHLORIDE SERPL-SCNC: 102 MMOL/L (ref 95–110)
CO2 SERPL-SCNC: 24 MMOL/L (ref 23–29)
CREAT SERPL-MCNC: 1.2 MG/DL (ref 0.5–1.4)
ERYTHROCYTE [DISTWIDTH] IN BLOOD BY AUTOMATED COUNT: 13.8 % (ref 11.5–14.5)
GFR SERPLBLD CREATININE-BSD FMLA CKD-EPI: 57 ML/MIN/1.73/M2
GLUCOSE SERPL-MCNC: 111 MG/DL (ref 70–110)
HCT VFR BLD AUTO: 26.3 % (ref 37–48.5)
HGB BLD-MCNC: 8.3 GM/DL (ref 12–16)
IMM GRANULOCYTES # BLD AUTO: 0.2 K/UL (ref 0–0.04)
IMM GRANULOCYTES NFR BLD AUTO: 1.8 % (ref 0–0.5)
LYMPHOCYTES # BLD AUTO: 1.82 K/UL (ref 1–4.8)
MCH RBC QN AUTO: 29.1 PG (ref 27–31)
MCHC RBC AUTO-ENTMCNC: 31.6 G/DL (ref 32–36)
MCV RBC AUTO: 92 FL (ref 82–98)
NUCLEATED RBC (/100WBC) (OHS): 0 /100 WBC
PLATELET # BLD AUTO: 305 K/UL (ref 150–450)
PMV BLD AUTO: 8.7 FL (ref 9.2–12.9)
POTASSIUM SERPL-SCNC: 3.7 MMOL/L (ref 3.5–5.1)
RBC # BLD AUTO: 2.85 M/UL (ref 4–5.4)
RELATIVE EOSINOPHIL (OHS): 2.5 %
RELATIVE LYMPHOCYTE (OHS): 16.6 % (ref 18–48)
RELATIVE MONOCYTE (OHS): 6.9 % (ref 4–15)
RELATIVE NEUTROPHIL (OHS): 72 % (ref 38–73)
SODIUM SERPL-SCNC: 136 MMOL/L (ref 136–145)
WBC # BLD AUTO: 10.96 K/UL (ref 3.9–12.7)

## 2025-05-22 PROCEDURE — 99900031 HC PATIENT EDUCATION (STAT)

## 2025-05-22 PROCEDURE — 94640 AIRWAY INHALATION TREATMENT: CPT

## 2025-05-22 PROCEDURE — 25000003 PHARM REV CODE 250: Performed by: INTERNAL MEDICINE

## 2025-05-22 PROCEDURE — 80048 BASIC METABOLIC PNL TOTAL CA: CPT | Performed by: INTERNAL MEDICINE

## 2025-05-22 PROCEDURE — 85025 COMPLETE CBC W/AUTO DIFF WBC: CPT | Performed by: INTERNAL MEDICINE

## 2025-05-22 PROCEDURE — 94761 N-INVAS EAR/PLS OXIMETRY MLT: CPT

## 2025-05-22 PROCEDURE — 63600175 PHARM REV CODE 636 W HCPCS: Performed by: INTERNAL MEDICINE

## 2025-05-22 PROCEDURE — 36415 COLL VENOUS BLD VENIPUNCTURE: CPT | Performed by: INTERNAL MEDICINE

## 2025-05-22 PROCEDURE — 25000003 PHARM REV CODE 250: Performed by: HOSPITALIST

## 2025-05-22 PROCEDURE — 94799 UNLISTED PULMONARY SVC/PX: CPT

## 2025-05-22 PROCEDURE — 63600175 PHARM REV CODE 636 W HCPCS: Performed by: HOSPITALIST

## 2025-05-22 RX ORDER — FLUCONAZOLE 150 MG/1
150 TABLET ORAL ONCE
Status: DISCONTINUED | OUTPATIENT
Start: 2025-05-22 | End: 2025-05-22 | Stop reason: HOSPADM

## 2025-05-22 RX ORDER — OXYCODONE HYDROCHLORIDE 5 MG/1
5 TABLET ORAL EVERY 6 HOURS PRN
Qty: 28 TABLET | Refills: 0 | Status: SHIPPED | OUTPATIENT
Start: 2025-05-22 | End: 2025-05-29

## 2025-05-22 RX ORDER — POLYETHYLENE GLYCOL 3350 17 G/17G
17 POWDER, FOR SOLUTION ORAL DAILY
Qty: 1530 G | Refills: 0 | Status: SHIPPED | OUTPATIENT
Start: 2025-05-22

## 2025-05-22 RX ORDER — GABAPENTIN 100 MG/1
CAPSULE ORAL
Qty: 60 CAPSULE | Refills: 1 | Status: SHIPPED | OUTPATIENT
Start: 2025-05-22

## 2025-05-22 RX ADMIN — CETIRIZINE HYDROCHLORIDE 10 MG: 10 TABLET, FILM COATED ORAL at 08:05

## 2025-05-22 RX ADMIN — FLUTICASONE FUROATE AND VILANTEROL TRIFENATATE 1 PUFF: 200; 25 POWDER RESPIRATORY (INHALATION) at 08:05

## 2025-05-22 RX ADMIN — FERROUS SULFATE TAB EC 325 MG (65 MG FE EQUIVALENT) 1 EACH: 325 (65 FE) TABLET DELAYED RESPONSE at 08:05

## 2025-05-22 RX ADMIN — SODIUM CHLORIDE 125 MG: 9 INJECTION, SOLUTION INTRAVENOUS at 08:05

## 2025-05-22 RX ADMIN — GABAPENTIN 100 MG: 100 CAPSULE ORAL at 08:05

## 2025-05-22 RX ADMIN — ONDANSETRON 4 MG: 2 INJECTION INTRAMUSCULAR; INTRAVENOUS at 11:05

## 2025-05-22 RX ADMIN — MONTELUKAST 10 MG: 10 TABLET, FILM COATED ORAL at 08:05

## 2025-05-22 RX ADMIN — AMOXICILLIN AND CLAVULANATE POTASSIUM 1 TABLET: 875; 125 TABLET, FILM COATED ORAL at 08:05

## 2025-05-22 RX ADMIN — OXYCODONE HYDROCHLORIDE 5 MG: 5 TABLET ORAL at 08:05

## 2025-05-22 NOTE — DISCHARGE INSTRUCTIONS
Please take Miralax or stool softener while taking Oxycodone. You can also take Miralax or stool softener if you notice constipation with iron pills as this is a possible side effect of iron pills.

## 2025-05-22 NOTE — SUBJECTIVE & OBJECTIVE
Interval History: NAEON, pt feeling a bit better with current pain regimen.     Review of Systems  Objective:     Vital Signs (Most Recent):  Temp: 99.7 °F (37.6 °C) (05/22/25 0734)  Pulse: 92 (05/22/25 0839)  Resp: 19 (05/22/25 0839)  BP: 110/73 (05/22/25 0734)  SpO2: 96 % (05/22/25 0839) Vital Signs (24h Range):  Temp:  [98.7 °F (37.1 °C)-99.7 °F (37.6 °C)] 99.7 °F (37.6 °C)  Pulse:  [87-95] 92  Resp:  [16-19] 19  SpO2:  [95 %-99 %] 96 %  BP: (104-118)/(58-73) 110/73     Weight: 103.9 kg (229 lb)  Body mass index is 38.11 kg/m².    Intake/Output Summary (Last 24 hours) at 5/22/2025 1119  Last data filed at 5/22/2025 0540  Gross per 24 hour   Intake --   Output 40 ml   Net -40 ml         Physical Exam  Vitals and nursing note reviewed.   Constitutional:       Appearance: She is obese.   HENT:      Head: Normocephalic and atraumatic.   Eyes:      General: No scleral icterus.  Cardiovascular:      Rate and Rhythm: Normal rate and regular rhythm.      Heart sounds: No murmur heard.  Pulmonary:      Effort: Pulmonary effort is normal. No respiratory distress.      Breath sounds: No wheezing.      Comments: Decreased breath sounds but pt wearing compression garment  Abdominal:      Comments: Abdominal binder in place, 2 drains in suprapubic region w/ serous drainage   Musculoskeletal:      Right lower leg: No edema.      Left lower leg: No edema.   Neurological:      Mental Status: She is alert and oriented to person, place, and time.               Significant Labs: All pertinent labs within the past 24 hours have been reviewed.    Significant Imaging: I have reviewed all pertinent imaging results/findings within the past 24 hours.

## 2025-05-22 NOTE — PROGRESS NOTES
[facility] - [facility] [address] [place] [address] Medicine  Progress Note    Patient Name: Kianna Beckett  MRN: [id number]  Patient Class: IP- Inpatient   Admission Date: 5/17/2025  Length of Stay: 2 days  Attending Physician: Amado Osorio MD  Primary Care Provider: Peg, Primary Doctor        Subjective     Principal Problem:Dyspnea on exertion        HPI:  43 y/o AAF w/ Asthma, allergic rhinitis, obesity presents to the ED for complaints of dyspnea on exertion.  She reports undergoing multiple cosmetic surgery procedures on 5/14 this past week @ an ambulatory surgery clinic in [place].  Procedures included, tummy tuck, breast reduction, breast lift, liposuction from buttocks & back.  She recovered after surgery in hotel, had a friend accompany her, she reported feeling unwell after surgery and was concerned about an area of wound dehiscence at site of tummy tuck.  She returned to surgical clinic, initially reports they told her no dehischence but states when wound was inspected, there was opening along incision and additional sutures placed.  She has two bulb drains(SABINA?) from site of tummy tuck.      On flight back reports experiencing dyspnea on exertion that has persistent since arrival back to home on Friday.  She feels dehydrated, notes oliguria.  She is worried about her blood counts, pre-op hgb in April [facility] labs 13.4.  Initial hgb here is 10.0  she states she was in surgery for 6 hours, received general anesthesia.   She was given instructions for wound care, 2 week course of augmentin, PRN oxycodone, ativan prescriptions.  She reports she was going to remove surgical drains when output ceased.   She has compression garment for breasts & abdominal binder.     Per ED notes she also experienced right calf pain and swelling, In the ED she had w/u for infection with blood cultures, given doses of Vancomycin and Zosyn, received albuterol nebulizer. Dilaudid 0.5mg/1mg iv, Toradol 15mg IV, Zofran 4mg x1, 1Liter normal  "saline.     Imaging studies of US Bilat LE venous, CTA Chest PE protocol, CT abd pelvis w/ IV contrast.   CT imaging notable for a fluid collection in soft tissue of back, possibly a seroma and a "Soft tissue abnormality within the perineum to the left of the rectum of uncertain etiology"    Procedure performed @ [facility], [place], [place]     Overview/Hospital Course:  Patient admitted for symptomatic anemia with symptoms of dyspnea on exertion. Had recent cosmetic surgery in [facility] (BBL/tummy tuck) and flew back on post op day 2. CTA chest negative for PE. Discussed with plastic surgery, CT imaging consistent with normal post-operative findings. No evidence of infection. Markedly anemic compared to prior labs (13 -> 8.5) likely from acute blood loss from surgery. Iron labs consistent with iron deficiency anemia. Started on IV iron and given 1u pRBC for symptomatic anemia. Added gabapentin for further pain control. Wound care consulted. Developed RLE edema overnight; RLE ultrasound negative for DVT. Receiving daily IV iron and VALERIO slowly improving. At discharge patient able to ambulate to restroom, but feels the need for a rolling walker for distances beyond bathroom. Also requested elevated commode because her toilet at home is too low. Discharged with iron supplementation, pain regimen for post-op pain, advised to take bowel regimen while on those medications, to continue finishing Augmentin as recommended by her Plastic Surgeon and advised PCP follow up.    Interval History: RASTA, pt feeling a bit better with current pain regimen.     Review of Systems  Objective:     Vital Signs (Most Recent):  Temp: 99.7 °F (37.6 °C) (05/22/25 0734)  Pulse: 92 (05/22/25 0839)  Resp: 19 (05/22/25 0839)  BP: 110/73 (05/22/25 0734)  SpO2: 96 % (05/22/25 0839) Vital Signs (24h Range):  Temp:  [98.7 °F (37.1 °C)-99.7 °F (37.6 °C)] 99.7 °F (37.6 °C)  Pulse:  [87-95] 92  Resp:  [16-19] 19  SpO2:  [95 %-99 %] 96 %  BP: " (104-118)/(58-73) 110/73     Weight: 103.9 kg (229 lb)  Body mass index is 38.11 kg/m².    Intake/Output Summary (Last 24 hours) at 5/22/2025 1119  Last data filed at 5/22/2025 0540  Gross per 24 hour   Intake --   Output 40 ml   Net -40 ml         Physical Exam  Vitals and nursing note reviewed.   Constitutional:       Appearance: She is obese.   HENT:      Head: Normocephalic and atraumatic.   Eyes:      General: No scleral icterus.  Cardiovascular:      Rate and Rhythm: Normal rate and regular rhythm.      Heart sounds: No murmur heard.  Pulmonary:      Effort: Pulmonary effort is normal. No respiratory distress.      Breath sounds: No wheezing.      Comments: Decreased breath sounds but pt wearing compression garment  Abdominal:      Comments: Abdominal binder in place, 2 drains in suprapubic region w/ serous drainage   Musculoskeletal:      Right lower leg: No edema.      Left lower leg: No edema.   Neurological:      Mental Status: She is alert and oriented to person, place, and time.               Significant Labs: All pertinent labs within the past 24 hours have been reviewed.    Significant Imaging: I have reviewed all pertinent imaging results/findings within the past 24 hours.      Assessment & Plan  Dyspnea on exertion  -s/p CT chest imaging - pt with atelectasis, her CBC trend overnight shows downtrending hgb new anemia   -no acute wheezing on exam - but will scheduled some nebulizers.   -She is having severe pain with minimal movement in bed and suspect some hypoventilation leading to atelectatic changes on CT given surgical procedure to the back/abdomen and breast    - start incentive spirometry  - workup of anemia as per Acute blood loss anemia problem.   - TTE normal  - Improving  - Plan for walk test with pulse ox    Acute blood loss anemia  -etiology secondary to recent surgical procedures and operative blood loss  -Add-on iron studies to AM labs - f/u results, patient takes oral iron as  outpatient and had normal CBC pre-op.  I suspect she will require IV iron infusions if deficient.  If symptoms persist despite IV iron therapy would discuss with patient PRBC for symptomatic anemia.     Plan  - Monitor serial CBC: daily  - Transfuse PRBC if patient becomes hemodynamically unstable, symptomatic or H/H drops below 7/21.  - Patient has not received any PRBC transfusions to date  - Patient's anemia is currently stable  - S/p 1u pRBC for symptomatic anemia on 5/19  - Continue IV iron today and dc with po regimen and concurrent bowel regimen.    Most recent hemoglobin and hematocrit are listed below.  Recent Labs     05/20/25  1454 05/21/25  0516 05/22/25  0354   HGB 9.1* 8.4* 8.3*   HCT 29.6* 26.5* 26.3*     MEGHANN (acute kidney injury)  - Cr 0.7 at admission, baseline 0.7  - likely pre-renal in setting of acute blood loss/poor PO intake after surgery  - Renally dosing all medications  - Avoiding nephrotoxins  - Will continue to monitor on daily labs  - Urine studies ordered  - mIVF    Moderate persistent asthma without complication  Continue home BREO     Schedule duonebs q6hrs while awake x 2 days.     Do not suspect an isolated asthma exacerbation will hold any glucocorticoid at this time given empiric antibiotics and recent surgical procedures.     S/P cosmetic plastic surgery  Patient with abnormal findings on CT scan   -Plastic surgery consulted by ED team, pt reports she was seen overnight. Would f/u with Plastic surgery consult team during day time to review patients imaging and discuss if the abnormal soft tissue fluid collection in the back and the perineal soft tissue abnormality would be expected findings s/p the procedures pt had or if they might represent an infective focus.     - Discussed with plastic surgery; CT imaging consistent with post-operative findings  - Remains afebrile, no leukocytosis  - Will DC IV antibiotics and continue Augmentin  - Pain control; adding gabapentin  - Post op  wound care. Wound care consulted. OP wound care referral.     Kianna requires a commode for home use because she is confined to one level of the home environment and there is no toilet on that level.   Kianna's mobility limitation cannot be sufficiently resolved by the use of a cane. Her functional mobility deficit can be sufficiently resolved with the use of a Rolling Walker. Patient's mobility limitation significantly impairs their ability to participate in one of more activities of daily living.  The use of a RW will significantly improve the patient's ability to participate in MRADLS and the patient will use it on regular basis in the home.   Obesity (BMI 35.0-39.9 without comorbidity)  Body mass index is 38.11 kg/m². Morbid obesity complicates all aspects of disease management from diagnostic modalities to treatment.     Allergic rhinitis  Continue cetirizine and fluticasone.     VTE Risk Mitigation (From admission, onward)           Ordered     enoxaparin injection 40 mg  Every 24 hours         05/20/25 0909     IP VTE HIGH RISK PATIENT  Once         05/18/25 0333     Place sequential compression device  Until discontinued         05/18/25 0333                    Discharge Planning   PAUL: 5/22/2025     Code Status: Full Code   Medical Readiness for Discharge Date: 5/22/2025  Discharge Plan A: Home                        Amado Osorio MD  Department of Hospital Medicine   [facility] - Observation 11H

## 2025-05-22 NOTE — PLAN OF CARE
[facility]  Discharge Final Note    Primary Care Provider: No, Primary Doctor    Expected Discharge Date: 5/22/2025    Patient discharged to home via personal transportation.     Patient's bedside nurse and patient notified of the above.    Discharge Plan A and Plan B have been determined by review of patient's clinical status, future medical and therapeutic needs, and coverage/benefits for post-acute care in coordination with multidisciplinary team members.        Final Discharge Note (most recent)       Final Note - 05/22/25 1422          Final Note    Assessment Type Final Discharge Note (P)      Anticipated Discharge Disposition Home or Self Care (P)         Post-Acute Status    Post-Acute Authorization Other (P)      Other Status No Post-Acute Service Needs (P)                      Important Message from Medicare                 Future Appointments   Date Time Provider Department Center   5/26/2025 10:00 AM Annie Wilkins PA-C [facility]        scheduled post-discharge follow-up appointment and information added to AVS.     Alanna Vicente LMSW  [facility]  Ext. [phone]

## 2025-05-22 NOTE — ASSESSMENT & PLAN NOTE
Patient with abnormal findings on CT scan   -Plastic surgery consulted by ED team, pt reports she was seen overnight. Would f/u with Plastic surgery consult team during day time to review patients imaging and discuss if the abnormal soft tissue fluid collection in the back and the perineal soft tissue abnormality would be expected findings s/p the procedures pt had or if they might represent an infective focus.     - Discussed with plastic surgery; CT imaging consistent with post-operative findings  - Remains afebrile, no leukocytosis  - Will DC IV antibiotics and continue Augmentin  - Pain control; adding gabapentin  - Post op wound care. Wound care consulted. OP wound care referral.     Kianna requires a commode for home use because she is confined to one level of the home environment and there is no toilet on that level.   Kianna's mobility limitation cannot be sufficiently resolved by the use of a cane. Her functional mobility deficit can be sufficiently resolved with the use of a Rolling Walker. Patient's mobility limitation significantly impairs their ability to participate in one of more activities of daily living.  The use of a RW will significantly improve the patient's ability to participate in MRADLS and the patient will use it on regular basis in the home.

## 2025-05-22 NOTE — DISCHARGE SUMMARY
[facility] - Observation [address] [place] [address] Medicine  Discharge Summary      Patient Name: Kianna Beckett  MRN: [id number]  ALMA: [id number]  Patient Class: IP- Inpatient  Admission Date: 5/17/2025  Hospital Length of Stay: 2 days  Discharge Date and Time: 05/22/2025 1:16 PM  Attending Physician: Amado Osorio MD   Discharging Provider: Amado Osorio MD  Primary Care Provider: Peg, Primary Doctor  [facility] Medicine Team: [facility]OS[facility] O Amado Osorio MD  Primary Care Team: [facility] O    HPI:   45 y/o AAF w/ Asthma, allergic rhinitis, obesity presents to the ED for complaints of dyspnea on exertion.  She reports undergoing multiple cosmetic surgery procedures on 5/14 this past week @ an ambulatory surgery clinic in [place].  Procedures included, tummy tuck, breast reduction, breast lift, liposuction from buttocks & back.  She recovered after surgery in hotel, had a friend accompany her, she reported feeling unwell after surgery and was concerned about an area of wound dehiscence at site of tummy tuck.  She returned to surgical clinic, initially reports they told her no dehischence but states when wound was inspected, there was opening along incision and additional sutures placed.  She has two bulb drains(SABINA?) from site of tummy tuck.      On flight back reports experiencing dyspnea on exertion that has persistent since arrival back to home on Friday.  She feels dehydrated, notes oliguria.  She is worried about her blood counts, pre-op hgb in [facility] labs 13.4.  Initial hgb here is 10.0  she states she was in surgery for 6 hours, received general anesthesia.   She was given instructions for wound care, 2 week course of augmentin, PRN oxycodone, ativan prescriptions.  She reports she was going to remove surgical drains when output ceased.   She has compression garment for breasts & abdominal binder.     Per ED notes she also experienced right calf pain and swelling, In the ED she had w/u for infection with blood  "cultures, given doses of Vancomycin and Zosyn, received albuterol nebulizer. Dilaudid 0.5mg/1mg iv, Toradol 15mg IV, Zofran 4mg x1, 1Liter normal saline.     Imaging studies of US Bilat LE venous, CTA Chest PE protocol, CT abd pelvis w/ IV contrast.   CT imaging notable for a fluid collection in soft tissue of back, possibly a seroma and a "Soft tissue abnormality within the perineum to the left of the rectum of uncertain etiology"    Procedure performed @ [facility], [place], [place].     * No surgery found *      Hospital Course:   Patient admitted for symptomatic anemia with symptoms of dyspnea on exertion. Had recent cosmetic surgery in [facility] (BBL/tummy tuck) and flew back on post op day 2. CTA chest negative for PE. Discussed with plastic surgery, CT imaging consistent with normal post-operative findings. No evidence of infection. Markedly anemic compared to prior labs (13 -> 8.5) likely from acute blood loss from surgery. Iron labs consistent with iron deficiency anemia. Started on IV iron and given 1u pRBC for symptomatic anemia. Added gabapentin for further pain control. Wound care consulted. Developed RLE edema overnight; RLE ultrasound negative for DVT. Receiving daily IV iron and VALERIO slowly improving. At discharge patient able to ambulate to restroom, but feels the need for a rolling walker for distances beyond bathroom. Also requested elevated commode because her toilet at home is too low. Discharged with iron supplementation, pain regimen for post-op pain, advised to take bowel regimen while on those medications, to continue finishing Augmentin as recommended by her Plastic Surgeon and advised PCP follow up.     Goals of Care Treatment Preferences:  Code Status: Full Code      SDOH Screening:  The patient was screened for utility difficulties, food insecurity, transport difficulties, housing insecurity, and interpersonal safety and there were no concerns identified this admission.     Consults: "   Consults (From admission, onward)          Status Ordering Provider     Inpatient consult to Midline team  Once        Provider:  (Not yet assigned)    Completed PJ CAREY     Inpatient consult to PICC team ([facility])  Once        Provider:  (Not yet assigned)    Completed PJ CAREY            Assessment & Plan  Dyspnea on exertion  -s/p CT chest imaging - pt with atelectasis, her CBC trend overnight shows downtrending hgb new anemia   -no acute wheezing on exam - but will scheduled some nebulizers.   -She is having severe pain with minimal movement in bed and suspect some hypoventilation leading to atelectatic changes on CT given surgical procedure to the back/abdomen and breast    - start incentive spirometry  - workup of anemia as per Acute blood loss anemia problem.   - TTE normal  - Improving  - Plan for walk test with pulse ox    Acute blood loss anemia  -etiology secondary to recent surgical procedures and operative blood loss  -Add-on iron studies to AM labs - f/u results, patient takes oral iron as outpatient and had normal CBC pre-op.  I suspect she will require IV iron infusions if deficient.  If symptoms persist despite IV iron therapy would discuss with patient PRBC for symptomatic anemia.     Plan  - Monitor serial CBC: daily  - Transfuse PRBC if patient becomes hemodynamically unstable, symptomatic or H/H drops below 7/21.  - Patient has not received any PRBC transfusions to date  - Patient's anemia is currently stable  - S/p 1u pRBC for symptomatic anemia on 5/19  - Continue IV iron today and dc with po regimen and concurrent bowel regimen.    Most recent hemoglobin and hematocrit are listed below.  Recent Labs     05/20/25  1454 05/21/25  0516 05/22/25  0354   HGB 9.1* 8.4* 8.3*   HCT 29.6* 26.5* 26.3*     MEGHANN (acute kidney injury)  - Cr 0.7 at admission, baseline 0.7  - likely pre-renal in setting of acute blood loss/poor PO intake after surgery  - Renally dosing all medications  -  Avoiding nephrotoxins  - Will continue to monitor on daily labs  - Urine studies ordered  - mIVF    Moderate persistent asthma without complication  Continue home BREO     Schedule duonebs q6hrs while awake x 2 days.     Do not suspect an isolated asthma exacerbation will hold any glucocorticoid at this time given empiric antibiotics and recent surgical procedures.     S/P cosmetic plastic surgery  Patient with abnormal findings on CT scan   -Plastic surgery consulted by ED team, pt reports she was seen overnight. Would f/u with Plastic surgery consult team during day time to review patients imaging and discuss if the abnormal soft tissue fluid collection in the back and the perineal soft tissue abnormality would be expected findings s/p the procedures pt had or if they might represent an infective focus.     - Discussed with plastic surgery; CT imaging consistent with post-operative findings  - Remains afebrile, no leukocytosis  - Will DC IV antibiotics and continue Augmentin  - Pain control; adding gabapentin  - Post op wound care. Wound care consulted. OP wound care referral.     Kianna requires a commode for home use because she is confined to one level of the home environment and there is no toilet on that level.   Kianna's mobility limitation cannot be sufficiently resolved by the use of a cane. Her functional mobility deficit can be sufficiently resolved with the use of a Rolling Walker. Patient's mobility limitation significantly impairs their ability to participate in one of more activities of daily living.  The use of a RW will significantly improve the patient's ability to participate in MRADLS and the patient will use it on regular basis in the home.   Obesity (BMI 35.0-39.9 without comorbidity)  Body mass index is 38.11 kg/m². Morbid obesity complicates all aspects of disease management from diagnostic modalities to treatment.     Allergic rhinitis  Continue cetirizine and fluticasone.     Final  "Active Diagnoses:    Diagnosis Date Noted POA    PRINCIPAL PROBLEM:  Dyspnea on exertion [R06.09] 05/18/2025 Yes    MEGHANN (acute kidney injury) [N17.9] 05/20/2025 No    S/P cosmetic plastic surgery [Z98.890] 05/18/2025 Not Applicable    Acute blood loss anemia [D62] 05/18/2025 Yes    Moderate persistent asthma without complication [J45.40] 04/10/2024 Yes    Obesity (BMI 35.0-39.9 without comorbidity) [E66.9] 04/10/2024 Yes    Allergic rhinitis [J30.9] 04/10/2024 Yes      Problems Resolved During this Admission:       Discharged Condition: good    Disposition: Home or Self Care    Follow Up:    Patient Instructions:      COMMODE FOR HOME USE     Order Specific Question Answer Comments   Type: Standard    Height: 5' 5" (1.651 m)    Weight: 103.9 kg (229 lb)    Does patient have medical equipment at home? none    Length of need (1-99 months): 3      WALKER FOR HOME USE     Order Specific Question Answer Comments   Type of Walker: Adult (5'4"-6'6")    With wheels? Yes    Height: 5' 5" (1.651 m)    Weight: 103.9 kg (229 lb)    Length of need (1-99 months): 1    Does patient have medical equipment at home? none    Please check all that apply: Patient's condition impairs ambulation.        Significant Diagnostic Studies: N/A    Pending Diagnostic Studies:       None           Medications:  Reconciled Home Medications:      Medication List        START taking these medications      gabapentin 100 MG capsule  Commonly known as: NEURONTIN  Take 1 capsule (100 mg total) by mouth 2 (two) times a day AND 3 capsules (300 mg total) every evening.     oxyCODONE 5 MG immediate release tablet  Commonly known as: ROXICODONE  Take 1 tablet (5 mg total) by mouth every 6 (six) hours as needed for Pain.  Replaces: oxyCODONE-acetaminophen 5-325 mg per tablet     polyethylene glycol 17 gram/dose powder  Commonly known as: GLYCOLAX  Take 1 capful (17 g) by mouth once daily. Take while taking Oxycodone.            CONTINUE taking these " medications      albuterol-ipratropium 2.5 mg-0.5 mg/3 mL nebulizer solution  Commonly known as: DUO-NEB  Take 3 mLs by nebulization every 6 (six) hours as needed for Wheezing. Rescue     amoxicillin-clavulanate 875-125mg 875-125 mg per tablet  Commonly known as: AUGMENTIN  Take 1 tablet by mouth 2 (two) times daily.     butalbital-acetaminophen-caffeine -40 mg -40 mg per tablet  Commonly known as: FIORICET, ESGIC  Take 1 tablet by mouth every 4 (four) hours as needed for Headaches.     cetirizine 10 MG tablet  Commonly known as: ZYRTEC  Take 10 mg by mouth once daily.     diazePAM 5 MG tablet  Commonly known as: VALIUM  Take 2.5-5 mg by mouth every 8 (eight) hours as needed for Anxiety.     ferrous sulfate 325 (65 FE) MG EC tablet  Take 325 mg by mouth once daily.     fluticasone furoate-vilanteroL 200-25 mcg/dose Dsdv diskus inhaler  Commonly known as: BREO  Inhale 1 puff into the lungs once daily.     fluticasone propionate 50 mcg/actuation nasal spray  Commonly known as: FLONASE  1 spray (50 mcg total) by Each Nostril route 2 (two) times daily as needed for Rhinitis or Allergies.     LOVENOX 30 mg/0.3 mL Syrg  Generic drug: enoxaparin  Inject 30 mg into the skin once daily. (x 7 days)            STOP taking these medications      oxyCODONE-acetaminophen 5-325 mg per tablet  Commonly known as: PERCOCET  Replaced by: oxyCODONE 5 MG immediate release tablet              Indwelling Lines/Drains at time of discharge:   Lines/Drains/Airways       Drain  Duration                  Closed/Suction Drain 05/18/25 0109 Left Hip 4 days         Closed/Suction Drain 05/18/25 0109 Right Hip 4 days                    Time spent on the discharge of patient: 32 minutes         Amado Osorio MD  Department of Hospital Medicine  [facility] - Observation 11H

## 2025-05-22 NOTE — ASSESSMENT & PLAN NOTE
-etiology secondary to recent surgical procedures and operative blood loss  -Add-on iron studies to AM labs - f/u results, patient takes oral iron as outpatient and had normal CBC pre-op.  I suspect she will require IV iron infusions if deficient.  If symptoms persist despite IV iron therapy would discuss with patient PRBC for symptomatic anemia.     Plan  - Monitor serial CBC: daily  - Transfuse PRBC if patient becomes hemodynamically unstable, symptomatic or H/H drops below 7/21.  - Patient has not received any PRBC transfusions to date  - Patient's anemia is currently stable  - S/p 1u pRBC for symptomatic anemia on 5/19  - Continue IV iron today and dc with po regimen and concurrent bowel regimen.    Most recent hemoglobin and hematocrit are listed below.  Recent Labs     05/20/25  1454 05/21/25  0516 05/22/25  0354   HGB 9.1* 8.4* 8.3*   HCT 29.6* 26.5* 26.3*

## 2025-05-22 NOTE — NURSING
Pt discharged to home with family.  Pt is Aox4 and states verbal understanding of all discharge instructions.   IV access x1 removed with tip intact. Pt given wound care supplies to assist with home care. Pt left via wheelchair with all personal belongings.  Picked up medications from retail pharmacy on way out.  Nursing assisted patient into private car.  Pt left with 2 SABINA drains from prior surgery.  Pt safe.

## 2025-05-26 ENCOUNTER — OFFICE VISIT (OUTPATIENT)
Dept: INTERNAL MEDICINE | Facility: CLINIC | Age: 45
End: 2025-05-26
Payer: COMMERCIAL

## 2025-05-26 VITALS
HEART RATE: 90 BPM | SYSTOLIC BLOOD PRESSURE: 114 MMHG | WEIGHT: 231.06 LBS | OXYGEN SATURATION: 96 % | DIASTOLIC BLOOD PRESSURE: 70 MMHG | BODY MASS INDEX: 38.5 KG/M2 | HEIGHT: 65 IN

## 2025-05-26 DIAGNOSIS — D64.9 ANEMIA, UNSPECIFIED TYPE: ICD-10-CM

## 2025-05-26 DIAGNOSIS — Z09 HOSPITAL DISCHARGE FOLLOW-UP: Primary | ICD-10-CM

## 2025-05-26 DIAGNOSIS — Z98.890 S/P COSMETIC PLASTIC SURGERY: ICD-10-CM

## 2025-05-26 DIAGNOSIS — T81.31XA POSTOPERATIVE WOUND DEHISCENCE, INITIAL ENCOUNTER: ICD-10-CM

## 2025-05-26 PROCEDURE — 3074F SYST BP LT 130 MM HG: CPT | Mod: CPTII,S$GLB,, | Performed by: PHYSICIAN ASSISTANT

## 2025-05-26 PROCEDURE — 99999 PR PBB SHADOW E&M-EST. PATIENT-LVL V: CPT | Mod: PBBFAC,,, | Performed by: PHYSICIAN ASSISTANT

## 2025-05-26 PROCEDURE — 3078F DIAST BP <80 MM HG: CPT | Mod: CPTII,S$GLB,, | Performed by: PHYSICIAN ASSISTANT

## 2025-05-26 PROCEDURE — 3008F BODY MASS INDEX DOCD: CPT | Mod: CPTII,S$GLB,, | Performed by: PHYSICIAN ASSISTANT

## 2025-05-26 PROCEDURE — 99204 OFFICE O/P NEW MOD 45 MIN: CPT | Mod: S$GLB,,, | Performed by: PHYSICIAN ASSISTANT

## 2025-05-26 PROCEDURE — 1160F RVW MEDS BY RX/DR IN RCRD: CPT | Mod: CPTII,S$GLB,, | Performed by: PHYSICIAN ASSISTANT

## 2025-05-26 PROCEDURE — 1159F MED LIST DOCD IN RCRD: CPT | Mod: CPTII,S$GLB,, | Performed by: PHYSICIAN ASSISTANT

## 2025-05-26 PROCEDURE — 1111F DSCHRG MED/CURRENT MED MERGE: CPT | Mod: CPTII,S$GLB,, | Performed by: PHYSICIAN ASSISTANT

## 2025-05-26 RX ORDER — ONDANSETRON 4 MG/1
4 TABLET, ORALLY DISINTEGRATING ORAL EVERY 8 HOURS PRN
Qty: 12 TABLET | Refills: 0 | Status: SHIPPED | OUTPATIENT
Start: 2025-05-26

## 2025-05-26 RX ORDER — AMOXICILLIN AND CLAVULANATE POTASSIUM 875; 125 MG/1; MG/1
1 TABLET, FILM COATED ORAL 2 TIMES DAILY
Qty: 10 TABLET | Refills: 0 | Status: SHIPPED | OUTPATIENT
Start: 2025-05-26 | End: 2025-05-31

## 2025-05-26 RX ORDER — FLUCONAZOLE 150 MG/1
150 TABLET ORAL ONCE
Qty: 2 TABLET | Refills: 0 | Status: SHIPPED | OUTPATIENT
Start: 2025-05-26 | End: 2025-05-26

## 2025-05-26 NOTE — PROGRESS NOTES
Subjective:       Patient ID: Kianna Beckett is a 44 y.o. female with PMH of asthma, migraine, predm and obesity    Chief Complaint: Hospital Follow Up      Established pt of Gustavo Saleem MD (new to me)    HPI        Here for hospital discharge follow up  After admission for sob on 5/17, found to have post-operative symptomatic anemia after cosmetic sx on 5/14 in [place], [place]. Transfused 1pRBC and given IV iron. (See hospital course below)      Today, she attended by her aunt. Inquires about wound care as she notes wound dehiscence (noticed several days ago) to R breast with drainage, malodorous.   Still has SABINA drain in place.     She is making progress, walking more without a walker  No longer sob. No fevers or cp. Pain management well. Having BM. LE edema has improved.     Admission Date: 5/17/2025  Hospital Length of Stay: 2 days  Discharge Date and Time: 05/22/2025      Hospital Course:   Patient admitted for symptomatic anemia with symptoms of dyspnea on exertion. Had recent cosmetic surgery in [facility] (tummy tuck, breast reduction, breast lift and liposuction from buttocks and back) and flew back on post op day 2. CTA chest negative for PE. Discussed with plastic surgery, CT imaging consistent with normal post-operative findings. No evidence of infection. Markedly anemic compared to prior labs (13 -> 8.5) likely from acute blood loss from surgery. Iron labs consistent with iron deficiency anemia. Started on IV iron and given 1u pRBC for symptomatic anemia. Added gabapentin for further pain control. Wound care consulted. Developed RLE edema overnight; RLE ultrasound negative for DVT. Receiving daily IV iron and VALERIO slowly improving. At discharge patient able to ambulate to restroom, but feels the need for a rolling walker for distances beyond bathroom. Also requested elevated commode because her toilet at home is too low. Discharged with iron supplementation, pain regimen for post-op pain,  "advised to take bowel regimen while on those medications, to continue finishing Augmentin as recommended by her Plastic Surgeon and advised PCP follow up.     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    Past Surgical History:   Procedure Laterality Date    COSMETIC SURGERY         Review of Systems   Constitutional:  Negative for chills, diaphoresis and fever.   Respiratory:  Negative for cough and shortness of breath.    Cardiovascular:  Positive for leg swelling (improving).   Gastrointestinal:  Positive for nausea (occ). Negative for abdominal pain, constipation, diarrhea and vomiting.   Genitourinary:  Negative for bladder incontinence, dysuria and hematuria.   Integumentary:  Positive for wound. Negative for rash.       Objective: /70 (BP Location: Left arm, Patient Position: Sitting)   Pulse 90   Ht 5' 5" (1.651 m)   Wt 104.8 kg (231 lb 0.7 oz)   SpO2 96%   BMI 38.45 kg/m²         Physical Exam  Vitals reviewed.   Constitutional:       General: She is not in acute distress.     Appearance: She is well-developed.   HENT:      Head: Normocephalic and atraumatic.   Cardiovascular:      Rate and Rhythm: Normal rate and regular rhythm.      Heart sounds: No murmur heard.  Pulmonary:      Effort: Pulmonary effort is normal.      Breath sounds: Normal breath sounds. No wheezing or rales.   Chest:      Comments: R breast incision dehiscences with slough and scant purulent drainage. No significant erythema  Abdominal:      General: Bowel sounds are normal.      Palpations: Abdomen is soft.      Tenderness: There is no abdominal tenderness.      Comments: 2 SABINA drains intact to lower " abdomen with serosanguinous drainage    Musculoskeletal:      Right lower leg: Edema present.      Left lower leg: Edema present.   Skin:     General: Skin is warm and dry.      Findings: No rash.   Neurological:      Mental Status: She is alert.   Psychiatric:         Mood and Affect: Mood normal.                   Assessment:       1. Hospital discharge follow-up    2. S/P cosmetic plastic surgery    3. Anemia, unspecified type    4. Postoperative wound dehiscence, initial encounter        Plan:             Kianna was seen today for hospital follow up.    Diagnoses and all orders for this visit:    Hospital discharge follow-up    S/P cosmetic plastic surgery  -     Ambulatory referral/consult to Wound Clinic; Future  -     ondansetron (ZOFRAN-ODT) 4 MG TbDL; Take 1 tablet (4 mg total) by mouth every 8 (eight) hours as needed (nausea).  -     Ambulatory referral/consult to Wound Clinic; Future    Postoperative wound dehiscence, initial encounter  -     Ambulatory referral/consult to Wound Clinic; Future  -     amoxicillin-clavulanate 875-125mg (AUGMENTIN) 875-125 mg per tablet; Take 1 tablet by mouth 2 (two) times daily. for 5 days  -     Ambulatory referral/consult to Wound Clinic; Future    Anemia, unspecified type    Other orders  -     fluconazole (DIFLUCAN) 150 MG Tab; Take 1 tablet (150 mg total) by mouth once. May repeat dose in 72hrs if needed. for 1 dose          Discharge summary, lab, notes and imaging reviewed  Slowly recovering  Mild dehiscence to R breast with scant drainage, wound cleansed with normal saline, extended abx for 5 days  Referred to Wound Clinic for further evaluations (external referral provided to pt as well)  Diflucan if needed for abx associated yeast infections  Advised on f/u with PCP and also to inform her surgeons on current status  RTC/ED precautions discussed.     Annie Wilkins PA-C       [1]   Social History  Tobacco Use    Smoking status: Never    Smokeless tobacco:  Never   Substance Use Topics    Alcohol use: Yes     Comment: socially    Drug use: Never   [2]   Current Outpatient Medications:     albuterol-ipratropium (DUO-NEB) 2.5 mg-0.5 mg/3 mL nebulizer solution, Take 3 mLs by nebulization every 6 (six) hours as needed for Wheezing. Rescue, Disp: , Rfl:     amoxicillin-clavulanate 875-125mg (AUGMENTIN) 875-125 mg per tablet, Take 1 tablet by mouth 2 (two) times daily., Disp: , Rfl:     butalbital-acetaminophen-caffeine -40 mg (FIORICET, ESGIC) -40 mg per tablet, Take 1 tablet by mouth every 4 (four) hours as needed for Headaches., Disp: , Rfl:     cetirizine (ZYRTEC) 10 MG tablet, Take 10 mg by mouth once daily., Disp: , Rfl:     diazePAM (VALIUM) 5 MG tablet, Take 2.5-5 mg by mouth every 8 (eight) hours as needed for Anxiety., Disp: , Rfl:     enoxaparin (LOVENOX) 30 mg/0.3 mL Syrg, Inject 30 mg into the skin once daily. (x 7 days), Disp: , Rfl:     ferrous sulfate 325 (65 FE) MG EC tablet, Take 325 mg by mouth once daily., Disp: , Rfl:     fluticasone furoate-vilanterol (BREO) 200-25 mcg/dose DsDv diskus inhaler, Inhale 1 puff into the lungs once daily., Disp: , Rfl:     fluticasone propionate (FLONASE) 50 mcg/actuation nasal spray, 1 spray (50 mcg total) by Each Nostril route 2 (two) times daily as needed for Rhinitis or Allergies., Disp: 15 g, Rfl: 0    gabapentin (NEURONTIN) 100 MG capsule, Take 1 capsule (100 mg total) by mouth 2 (two) times a day AND 3 capsules (300 mg total) every evening., Disp: 60 capsule, Rfl: 1    oxyCODONE (ROXICODONE) 5 MG immediate release tablet, Take 1 tablet (5 mg total) by mouth every 6 (six) hours as needed for Pain., Disp: 28 tablet, Rfl: 0    polyethylene glycol (GLYCOLAX) 17 gram/dose powder, Take 1 capful (17 g) by mouth once daily. Take while taking Oxycodone., Disp: 1530 g, Rfl: 0

## 2025-06-12 ENCOUNTER — HOSPITAL ENCOUNTER (OUTPATIENT)
Dept: RADIOLOGY | Facility: HOSPITAL | Age: 45
Discharge: HOME | End: 2025-06-12
Attending: NURSE PRACTITIONER
Payer: COMMERCIAL

## 2025-06-12 DIAGNOSIS — Z98.890 STATUS POST ABDOMINOPLASTY: ICD-10-CM

## 2025-06-12 PROCEDURE — 76705 ECHO EXAM OF ABDOMEN: CPT | Mod: TC
